# Patient Record
Sex: FEMALE | Race: WHITE | Employment: FULL TIME | URBAN - METROPOLITAN AREA
[De-identification: names, ages, dates, MRNs, and addresses within clinical notes are randomized per-mention and may not be internally consistent; named-entity substitution may affect disease eponyms.]

---

## 2017-02-23 ENCOUNTER — OFFICE VISIT (OUTPATIENT)
Dept: OBGYN | Facility: CLINIC | Age: 52
End: 2017-02-23
Payer: COMMERCIAL

## 2017-02-23 VITALS — BODY MASS INDEX: 23.48 KG/M2 | WEIGHT: 136.8 LBS

## 2017-02-23 DIAGNOSIS — Z15.09 BRCA GENE POSITIVE: Primary | ICD-10-CM

## 2017-02-23 DIAGNOSIS — Z15.01 BRCA GENE POSITIVE: Primary | ICD-10-CM

## 2017-02-23 PROCEDURE — 99213 OFFICE O/P EST LOW 20 MIN: CPT | Performed by: OBSTETRICS & GYNECOLOGY

## 2017-02-23 RX ORDER — ALPRAZOLAM 0.25 MG
.25-.5 TABLET ORAL
COMMUNITY
Start: 2016-07-20 | End: 2017-05-04

## 2017-02-23 NOTE — PROGRESS NOTES
SUBJECTIVE:                                                   Nichole Butler is a 51 year old female who presents to clinic today for the following health issue(s):  Patient presents with:  Breast Problem: hx of breast cancer        HPI:  Did self breast exam and concerned that she's feeling something in lateral left breast area.   S/P mastectomy and saline implants.   No pain, muscle strain or skin changes.    No LMP recorded. Patient is postmenopausal..   Patient is sexually active, .  Using menopause for contraception.    reports that she has quit smoking. She does not have any smokeless tobacco history on file.    STD testing offered?  Declined    Health maintenance updated:  yes    Today's PHQ-2 Score:   PHQ-2 (  [employer]) 3/29/2016   Q1: Little interest or pleasure in doing things 0   Q2: Feeling down, depressed or hopeless 0   PHQ-2 Score 0     Today's PHQ-9 Score:   PHQ-9 SCORE 2016   Total Score 3     Today's DAMION-7 Score:   DAMION-7 SCORE 2016   Total Score 8       Problem list and histories reviewed & adjusted, as indicated.  Additional history: as documented.    Patient Active Problem List   Diagnosis     Allergic rhinitis due to pollen     Atrophic vaginitis     BRCA gene positive     Lichen sclerosus     Family history of ovarian cancer     Worried well     Advanced care planning/counseling discussion     Past Surgical History   Procedure Laterality Date     Laparoscopic hysterectomy total       LSH - Total hys     Gyn surgery       BSO     Knee surgery       Laparoscopy  2011 for pelvic mass. Bx benign     Mastectomy, bilateral       prophylactic with reconstruction with saline implants due to BR Ca +       Social History   Substance Use Topics     Smoking status: Former Smoker     Smokeless tobacco: Not on file     Alcohol use Not on file      Problem (# of Occurrences) Relation (Name,Age of Onset)    Colon Cancer (1) Other: Uncle    Lung Cancer (1) Father (62)     Ovarian Cancer (1) Sister (54): Another sister with tumor of low malignant potential    Uterine Cancer (1) Other (62): aunt            Current Outpatient Prescriptions   Medication Sig     ALPRAZolam (XANAX) 0.25 MG tablet Take 0.25-0.5 mg by mouth     ranitidine (ZANTAC) 150 MG tablet Take by mouth 2 times daily     estradiol (ESTRACE VAGINAL) 0.1 MG/GM vaginal cream Place 1 g vaginally twice a week     EPINEPHrine (EPIPEN) 0.3 MG/0.3ML injection Inject 0.3 mLs (0.3 mg) into the muscle once as needed for anaphylaxis     multivitamin, therapeutic with minerals (MULTI-VITAMIN) TABS Take 1 tablet by mouth daily     glucosamine-chondroitin 500-400 MG CAPS Take 1 capsule by mouth daily     Polyethylene Glycol 3350 (MIRALAX PO)      Magnesium Oxide 250 MG TABS daily with breakfast. Takes 235 mg     No current facility-administered medications for this visit.      Allergies   Allergen Reactions     Diagnostic X-Ray Materials Hives     Hives to CT contrast. Ok with premedication.     Contrast Dye      CT     Penicillins      Sulfa Drugs        ROS:  12 point review of systems negative other than symptoms noted below.  Head: Ringing  Gastrointestinal: Abdominal Pain and Heartburn    OBJECTIVE:     Wt 136 lb 12.8 oz (62.1 kg)  BMI 23.48 kg/m2  Body mass index is 23.48 kg/(m^2).    Exam:  Constitutional:  Appearance: Well nourished, well developed alert, in no acute distress  Breasts:  Inspection of Breasts: S/P bilateral mastectomies and reconstruction. No mass  Axillary Lymph Nodes:  No lymphadenopathy present  Neurologic/Psychiatric:  Mental Status:  Oriented X3      In-Clinic Test Results:  No results found for this or any previous visit (from the past 24 hour(s)).    ASSESSMENT/PLAN:                                                        ICD-10-CM    1. BRCA gene positive Z15.01     Z15.02          Discussed negative exam. Pt has significant anxiety and unsure of normal anatomy. Reassured, will follow.    Osvaldo  MD Annette  [facility] FOR [facility]

## 2017-02-23 NOTE — MR AVS SNAPSHOT
After Visit Summary   2/23/2017    Nichole Butler    MRN: [id number]           Patient Information     Date Of Birth          1965        Visit Information        Provider Department      2/23/2017 10:00 AM Osvaldo Bryant MD [facility]        Today's Diagnoses     BRCA gene positive    -  1       Follow-ups after your visit        Your next 10 appointments already scheduled     May 19, 2017  9:00 AM CDT   PHYSICAL with Osvaldo Bryant MD   [facility] ([facility]    [address] [place] [id number] [phone]              Who to contact     If you have questions or need follow up information about today's clinic visit or your schedule please contact [facility] directly at [phone].  Normal or non-critical lab and imaging results will be communicated to you by [employer], letter or phone within 4 business days after the clinic has received the results. If you do not hear from us within 7 days, please contact the clinic through Innovit[employer] or phone. If you have a critical or abnormal lab result, we will notify you by phone as soon as possible.  Submit refill requests through [employer] or call your pharmacy and they will forward the refill request to us. Please allow 3 business days for your refill to be completed.          Additional Information About Your Visit        MyChart Information     [employer] gives you secure access to your electronic health record. If you see a primary care provider, you can also send messages to your care team and make appointments. If you have questions, please call your primary care clinic.  If you do not have a primary care provider, please call [phone] and they will assist you.        Care EveryWhere ID     This is your Care EveryWhere ID. This could be used by other organizations to access your [facility] medical records  [id number]         Your Vitals Were     BMI (Body Mass Index)                   23.48 kg/m2            Blood Pressure from Last 3 Encounters:   11/17/16 116/76   05/18/16 106/64   03/29/16 98/60    Weight from Last 3 Encounters:   02/23/17 136 lb 12.8 oz (62.1 kg)   11/17/16 136 lb (61.7 kg)   05/18/16 137 lb (62.1 kg)              Today, you had the following     No orders found for display       Primary Care Provider Office Phone # Fax #    Tanmay Flores [phone]       [facility] [address]  [place] [facility] [address]        Thank you!     Thank you for choosing [facility] [place]  for your care. Our goal is always to provide you with excellent care. Hearing back from our patients is one way we can continue to improve our services. Please take a few minutes to complete the written survey that you may receive in the mail after your visit with us. Thank you!             Your Updated Medication List - Protect others around you: Learn how to safely use, store and throw away your medicines at www.disposemymeds.org.          This list is accurate as of: 2/23/17 10:43 AM.  Always use your most recent med list.                   Brand Name Dispense Instructions for use    ALPRAZolam 0.25 MG tablet    XANAX     Take 0.25-0.5 mg by mouth       EPIPEN 0.3 MG/0.3ML injection   Generic drug:  EPINEPHrine      Inject 0.3 mLs (0.3 mg) into the muscle once as needed for anaphylaxis       estradiol 0.1 MG/GM cream    ESTRACE VAGINAL    42.5 g    Place 1 g vaginally twice a week       glucosamine-chondroitin 500-400 MG Caps per capsule      Take 1 capsule by mouth daily       Magnesium Oxide 250 MG Tabs      daily with breakfast. Takes 235 mg       MIRALAX PO          Multi-vitamin Tabs tablet      Take 1 tablet by mouth daily       ranitidine 150 MG tablet    ZANTAC     Take by mouth 2 times daily

## 2017-05-04 ENCOUNTER — OFFICE VISIT (OUTPATIENT)
Dept: OBGYN | Facility: CLINIC | Age: 52
End: 2017-05-04
Payer: COMMERCIAL

## 2017-05-04 VITALS
SYSTOLIC BLOOD PRESSURE: 110 MMHG | WEIGHT: 136 LBS | DIASTOLIC BLOOD PRESSURE: 64 MMHG | HEIGHT: 64 IN | BODY MASS INDEX: 23.22 KG/M2

## 2017-05-04 DIAGNOSIS — N95.2 ATROPHIC VAGINITIS: ICD-10-CM

## 2017-05-04 DIAGNOSIS — Z01.419 ENCOUNTER FOR GYNECOLOGICAL EXAMINATION WITHOUT ABNORMAL FINDING: Primary | ICD-10-CM

## 2017-05-04 DIAGNOSIS — Z80.41 FAMILY HISTORY OF OVARIAN CANCER: ICD-10-CM

## 2017-05-04 PROCEDURE — 99396 PREV VISIT EST AGE 40-64: CPT | Performed by: OBSTETRICS & GYNECOLOGY

## 2017-05-04 RX ORDER — CITALOPRAM HYDROBROMIDE 10 MG/1
TABLET ORAL
Refills: 3 | COMMUNITY
Start: 2016-07-13 | End: 2017-05-04

## 2017-05-04 RX ORDER — CLONAZEPAM 1 MG/1
TABLET ORAL
Refills: 2 | COMMUNITY
Start: 2016-08-28 | End: 2017-05-04

## 2017-05-04 RX ORDER — ESTRADIOL 0.1 MG/G
1 CREAM VAGINAL
Qty: 42.5 G | Refills: 2 | Status: SHIPPED | OUTPATIENT
Start: 2017-05-04 | End: 2018-01-22

## 2017-05-04 ASSESSMENT — ANXIETY QUESTIONNAIRES
IF YOU CHECKED OFF ANY PROBLEMS ON THIS QUESTIONNAIRE, HOW DIFFICULT HAVE THESE PROBLEMS MADE IT FOR YOU TO DO YOUR WORK, TAKE CARE OF THINGS AT HOME, OR GET ALONG WITH OTHER PEOPLE: NOT DIFFICULT AT ALL
3. WORRYING TOO MUCH ABOUT DIFFERENT THINGS: SEVERAL DAYS
GAD7 TOTAL SCORE: 3
7. FEELING AFRAID AS IF SOMETHING AWFUL MIGHT HAPPEN: NOT AT ALL
6. BECOMING EASILY ANNOYED OR IRRITABLE: NOT AT ALL
2. NOT BEING ABLE TO STOP OR CONTROL WORRYING: SEVERAL DAYS
1. FEELING NERVOUS, ANXIOUS, OR ON EDGE: SEVERAL DAYS
5. BEING SO RESTLESS THAT IT IS HARD TO SIT STILL: NOT AT ALL

## 2017-05-04 ASSESSMENT — PATIENT HEALTH QUESTIONNAIRE - PHQ9: 5. POOR APPETITE OR OVEREATING: NOT AT ALL

## 2017-05-04 NOTE — PROGRESS NOTES
Nichole is a 51 year old  female who presents for annual exam.     Besides routine health maintenance, she has no other health concerns today .    HPI:  The patient's PCP is EDVIN DOHERTY.   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 This is great news for her.   Still using vaginal estrogen.   Hasn't had repeat genetics consult yet to discuss the implications      GYNECOLOGIC HISTORY:    No LMP recorded. Patient has had a hysterectomy.  Her current contraception method is: hysterectomy.  She  reports that she has quit smoking. She has never used smokeless tobacco.    Patient is sexually active.  STD testing offered?  Declined  Last PHQ-9 score on record =   PHQ-9 SCORE 2017   Total Score 0     Last GAD7 score on record =   DAMION-7 SCORE 2017   Total Score 3     Alcohol Score = 2    HEALTH MAINTENANCE:  Cholesterol: Followed by PCP  Last Mammo: N/A, Result: not applicable, Next Mammo: N/A - bilateral mastectomy  Pap: N/A - Total hysterectomy  Colonoscopy: 2015, Result: normal, Next Colonoscopy:   Dexa: Never    Health maintenance updated:  yes    HISTORY:  Obstetric History       T3      TAB0   SAB1   E0   M0   L3       # Outcome Date GA Lbr Jose/2nd Weight Sex Delivery Anes PTL Lv   4 SAB            3 Term         Y   2 Term         Y   1 Term         Y          Patient Active Problem List   Diagnosis     Allergic rhinitis due to pollen     Atrophic vaginitis     BRCA gene positive     Lichen sclerosus     Family history of ovarian cancer     Worried well     Advanced care planning/counseling discussion     Past Surgical History:   Procedure Laterality Date     GYN SURGERY      BSO     KNEE SURGERY       LAPAROSCOPIC HYSTERECTOMY TOTAL      LTH - No cervix     LAPAROSCOPY      [facility] for pelvic mass. Bx benign     MASTECTOMY, BILATERAL      prophylactic with reconstruction with saline implants due to BR Ca +       Social  "History   Substance Use Topics     Smoking status: Former Smoker     Smokeless tobacco: Never Used     Alcohol use 0.0 oz/week     0 Standard drinks or equivalent per week      Problem (# of Occurrences) Relation (Name,Age of Onset)    Colon Cancer (1) Other: Uncle    Lung Cancer (1) Father (62)    Ovarian Cancer (1) Sister (54): Another sister with tumor of low malignant potential    Uterine Cancer (1) Other (62): aunt            Current Outpatient Prescriptions   Medication Sig     Magnesium Oxide 250 MG TABS daily with breakfast. Takes 235 mg     ranitidine (ZANTAC) 150 MG tablet Take by mouth 2 times daily     estradiol (ESTRACE VAGINAL) 0.1 MG/GM vaginal cream Place 1 g vaginally twice a week     EPINEPHrine (EPIPEN) 0.3 MG/0.3ML injection Inject 0.3 mLs (0.3 mg) into the muscle once as needed for anaphylaxis     multivitamin, therapeutic with minerals (MULTI-VITAMIN) TABS Take 1 tablet by mouth daily     glucosamine-chondroitin 500-400 MG CAPS Take 1 capsule by mouth daily     Polyethylene Glycol 3350 (MIRALAX PO)      No current facility-administered medications for this visit.      Allergies   Allergen Reactions     Diagnostic X-Ray Materials Hives     Hives to CT contrast. Ok with premedication.     Contrast Dye      CT     Penicillins      Sulfa Drugs        Past medical, surgical, social and family histories were reviewed and updated in EPIC.    ROS:   12 point review of systems negative other than symptoms noted below.  Head: Ringing  Gastrointestinal: Heartburn  Genitourinary: Vaginal Dryness  Neurologic: Headaches  Musculoskeletal: Joint Pain  Psychiatric: Anxiety    EXAM:  /64  Ht 5' 4\" (1.626 m)  Wt 136 lb (61.7 kg)  Breastfeeding? No  BMI 23.34 kg/m2   BMI: Body mass index is 23.34 kg/(m^2).    PHYSICAL EXAM:  Constitutional:  Appearance: Well nourished, well developed, alert, in no acute distress  Neck:  Lymph Nodes:  No lymphadenopathy present    Thyroid:  Gland size normal, nontender, " no nodules or masses present  on palpation  Chest:  Respiratory Effort:  Breathing unlabored  Cardiovascular:    Heart: Auscultation:  Regular rate, normal rhythm, no murmurs present  Breasts: Inspection of Breasts:  No lymphadenopathy present    Palpation of Breasts and Axillae:  Bilateral mastectomy  Axillary Lymph Nodes:  No lymphadenopathy present  Gastrointestinal:   Abdominal Examination:  Abdomen nontender to palpation, tone normal without rigidity or guarding, no masses present, umbilicus without lesions   Liver and Spleen:  No hepatomegaly present, liver nontender to palpation    Hernias:  No hernias present  Lymphatic: Lymph Nodes:  No other lymphadenopathy present  Skin:  General Inspection:  No rashes present, no lesions present, no areas of  discoloration    Genitalia and Groin:  No rashes present, no lesions present, no areas of  discoloration, no masses present  Neurologic/Psychiatric:    Mental Status:  Oriented X3     Pelvic Exam:  External Genitalia:     Normal appearance for age, no discharge present, no tenderness present, no inflammatory lesions present, color normal  Vagina:     Normal vaginal vault without central or paravaginal defects, no discharge present, no inflammatory lesions present, no masses present  Bladder:     Nontender to palpation  Urethra:   Urethral Body:  Urethra palpation normal, urethra structural support normal   Urethral Meatus:  No erythema or lesions present  Cervix:     Surgically absent  Uterus:     Surgically absent  Adnexa:     Surgically absent  Perineum:     Perineum within normal limits, no evidence of trauma, no rashes or skin lesions present  Anus:     Anus within normal limits, no hemorrhoids present  Inguinal Lymph Nodes:     No lymphadenopathy present    COUNSELING:   Reviewed preventive health counseling, as reflected in patient instructions       Regular exercise    BMI: Body mass index is 23.34 kg/(m^2).      ASSESSMENT:  51 year old female with  satisfactory annual exam.    ICD-10-CM    1. Encounter for gynecological examination without abnormal finding Z01.419    2. Atrophic vaginitis N95.2 estradiol (ESTRACE VAGINAL) 0.1 MG/GM cream   3. Family history of ovarian cancer Z80.41        PLAN:  Discussed if not Brca mutation could consider HRT if having issues.     Osvaldo Bryant MD

## 2017-05-04 NOTE — MR AVS SNAPSHOT
After Visit Summary   5/4/2017    Nichole Butler    MRN: [id number]           Patient Information     Date Of Birth          1965        Visit Information        Provider Department      5/4/2017 9:30 AM Osvaldo Bryant MD [facility]        Today's Diagnoses     Encounter for gynecological examination without abnormal finding    -  1    Atrophic vaginitis           Follow-ups after your visit        Your next 10 appointments already scheduled     Nov 07, 2017  9:20 AM CST   SHORT with Osvaldo Bryant MD   [facility] ([facility])    [address] [place] [id number] [phone]              Who to contact     If you have questions or need follow up information about today's clinic visit or your schedule please contact [facility] directly at [phone].  Normal or non-critical lab and imaging results will be communicated to you by MyChart, letter or phone within 4 business days after the clinic has received the results. If you do not hear from us within 7 days, please contact the clinic through [employer]ATM[employer] or phone. If you have a critical or abnormal lab result, we will notify you by phone as soon as possible.  Submit refill requests through [employer] or call your pharmacy and they will forward the refill request to us. Please allow 3 business days for your refill to be completed.          Additional Information About Your Visit        MyChart Information     [employer] gives you secure access to your electronic health record. If you see a primary care provider, you can also send messages to your care team and make appointments. If you have questions, please call your primary care clinic.  If you do not have a primary care provider, please call [phone] and they will assist you.        Care EveryWhere ID     This is your Care EveryWhere ID. This could be used by other  "organizations to access your [facility] medical records  [id number]        Your Vitals Were     Height Breastfeeding? BMI (Body Mass Index)             5' 4\" (1.626 m) No 23.34 kg/m2          Blood Pressure from Last 3 Encounters:   05/04/17 110/64   11/17/16 116/76   05/18/16 106/64    Weight from Last 3 Encounters:   05/04/17 136 lb (61.7 kg)   02/23/17 136 lb 12.8 oz (62.1 kg)   11/17/16 136 lb (61.7 kg)              Today, you had the following     No orders found for display       Primary Care Provider Office Phone # Fax #    Tanmay Betancourtman [phone]       [facility] [address]  [place] [address]        Thank you!     Thank you for choosing [facility] [place]  for your care. Our goal is always to provide you with excellent care. Hearing back from our patients is one way we can continue to improve our services. Please take a few minutes to complete the written survey that you may receive in the mail after your visit with us. Thank you!             Your Updated Medication List - Protect others around you: Learn how to safely use, store and throw away your medicines at www.disposemymeds.org.          This list is accurate as of: 5/4/17 10:07 AM.  Always use your most recent med list.                   Brand Name Dispense Instructions for use    EPIPEN 0.3 MG/0.3ML injection   Generic drug:  EPINEPHrine      Inject 0.3 mLs (0.3 mg) into the muscle once as needed for anaphylaxis       estradiol 0.1 MG/GM cream    ESTRACE VAGINAL    42.5 g    Place 1 g vaginally twice a week       glucosamine-chondroitin 500-400 MG Caps per capsule      Take 1 capsule by mouth daily       Magnesium Oxide 250 MG Tabs      daily with breakfast. Takes 235 mg       MIRALAX PO          Multi-vitamin Tabs tablet      Take 1 tablet by mouth daily       ranitidine 150 MG tablet    ZANTAC     Take by mouth 2 times daily         "

## 2017-05-05 ASSESSMENT — ANXIETY QUESTIONNAIRES: GAD7 TOTAL SCORE: 3

## 2017-05-05 ASSESSMENT — PATIENT HEALTH QUESTIONNAIRE - PHQ9: SUM OF ALL RESPONSES TO PHQ QUESTIONS 1-9: 0

## 2017-11-07 ENCOUNTER — OFFICE VISIT (OUTPATIENT)
Dept: OBGYN | Facility: CLINIC | Age: 52
End: 2017-11-07
Payer: COMMERCIAL

## 2017-11-07 VITALS
HEIGHT: 64 IN | BODY MASS INDEX: 23.73 KG/M2 | WEIGHT: 139 LBS | SYSTOLIC BLOOD PRESSURE: 106 MMHG | DIASTOLIC BLOOD PRESSURE: 64 MMHG

## 2017-11-07 DIAGNOSIS — Z15.09 BRCA GENE POSITIVE: ICD-10-CM

## 2017-11-07 DIAGNOSIS — N95.2 ATROPHIC VAGINITIS: Primary | ICD-10-CM

## 2017-11-07 DIAGNOSIS — Z15.01 BRCA GENE POSITIVE: ICD-10-CM

## 2017-11-07 PROCEDURE — 99213 OFFICE O/P EST LOW 20 MIN: CPT | Performed by: OBSTETRICS & GYNECOLOGY

## 2017-11-07 NOTE — MR AVS SNAPSHOT
After Visit Summary   11/7/2017    Nichole Butler    MRN: [id number]           Patient Information     Date Of Birth          1965        Visit Information        Provider Department      11/7/2017 9:20 AM Osvaldo Bryant MD [facility]        Today's Diagnoses     Atrophic vaginitis    -  1    BRCA gene positive           Follow-ups after your visit        Your next 10 appointments already scheduled     May 08, 2018  9:20 AM CDT   [facility]    [address] [place] [id number] [phone]              Who to contact     If you have questions or need follow up information about today's clinic visit or your schedule please contact [facility] directly at [phone].  Normal or non-critical lab and imaging results will be communicated to you by MyChart, letter or phone within 4 business days after the clinic has received the results. If you do not hear from us within 7 days, please contact the clinic through MyChart or phone. If you have a critical or abnormal lab result, we will notify you by phone as soon as possible.  Submit refill requests through [employer] or call your pharmacy and they will forward the refill request to us. Please allow 3 business days for your refill to be completed.          Additional Information About Your Visit        MyChart Information     [employer] gives you secure access to your electronic health record. If you see a primary care provider, you can also send messages to your care team and make appointments. If you have questions, please call your primary care clinic.  If you do not have a primary care provider, please call [phone] and they will assist you.        Care EveryWhere ID     This is your Care EveryWhere ID. This could be used by other organizations to access your [facility]  "records  [id number]        Your Vitals Were     Height Breastfeeding? BMI (Body Mass Index)             5' 4\" (1.626 m) No 23.86 kg/m2          Blood Pressure from Last 3 Encounters:   11/07/17 106/64   05/04/17 110/64   11/17/16 116/76    Weight from Last 3 Encounters:   11/07/17 139 lb (63 kg)   05/04/17 136 lb (61.7 kg)   02/23/17 136 lb 12.8 oz (62.1 kg)              Today, you had the following     No orders found for display         Today's Medication Changes          These changes are accurate as of: 11/7/17 10:48 AM.  If you have any questions, ask your nurse or doctor.               These medicines have changed or have updated prescriptions.        Dose/Directions    estradiol 0.1 MG/GM cream   Commonly known as:  ESTRACE VAGINAL   This may have changed:  Another medication with the same name was removed. Continue taking this medication, and follow the directions you see here.   Used for:  Atrophic vaginitis   Changed by:  Osvaldo Bryant MD        Dose:  1 g   Place 1 g vaginally twice a week   Quantity:  42.5 g   Refills:  2                Primary Care Provider Office Phone # Fax #    Tanmay COOK [facility] [phone]       [facility] [address] [address]        Equal Access to Services     SAUL LEWIS AH: Hadii aad ku hadasho Soomaali, waaxda luqadaha, qaybta kaalmada adeegyada, kalee sherman. So [facility] [phone].    ATENCIÓN: Si habla español, tiene a paul disposición servicios gratuitos de asistencia lingüística. Ramon al [phone].    We comply with applicable federal civil rights laws and [place] laws. We do not discriminate on the basis of race, color, national origin, age, disability, sex, sexual orientation, or gender identity.            Thank you!     Thank you for choosing [facility] [place]  for your care. Our goal is always to provide you with excellent care. Hearing back from our patients " is one way we can continue to improve our services. Please take a few minutes to complete the written survey that you may receive in the mail after your visit with us. Thank you!             Your Updated Medication List - Protect others around you: Learn how to safely use, store and throw away your medicines at www.disposemymeds.org.          This list is accurate as of: 11/7/17 10:48 AM.  Always use your most recent med list.                   Brand Name Dispense Instructions for use Diagnosis    EPIPEN 0.3 MG/0.3ML injection 2-pack   Generic drug:  EPINEPHrine      Inject 0.3 mLs (0.3 mg) into the muscle once as needed for anaphylaxis        estradiol 0.1 MG/GM cream    ESTRACE VAGINAL    42.5 g    Place 1 g vaginally twice a week    Atrophic vaginitis       glucosamine-chondroitin 500-400 MG Caps per capsule      Take 1 capsule by mouth daily        Magnesium Oxide 250 MG Tabs      daily with breakfast. Takes 235 mg        MIRALAX PO           Multi-vitamin Tabs tablet      Take 1 tablet by mouth daily        ranitidine 150 MG tablet    ZANTAC     Take by mouth 2 times daily        sertraline 50 MG tablet    ZOLOFT     Take 25 mg by mouth

## 2017-11-07 NOTE — PROGRESS NOTES
SUBJECTIVE:                                                   Nichole Butler is a 52 year old female who presents to clinic today for the following health issue(s):  Patient presents with:  Breast Exam: 6 month breast exam, elective double mastectomy      Additional information: c/o vaginal discharge, denies itching or odor    HPI:  Doing well. Hasn't make contact with genetic counselor about change in gene mutation status.  Notes some vaginal or bladder wetness. Not everyday. No irritation. No burning or odor. Using Estrace cream once a week.      No LMP recorded. Patient has had a hysterectomy..   Patient is sexually active, .  Using hysterectomy for contraception.    reports that she has quit smoking. She has never used smokeless tobacco.      STD testing offered?  Declined    Health maintenance updated:  yes    Today's PHQ-2 Score:   PHQ-2 (  [employer]) 3/29/2016   Q1: Little interest or pleasure in doing things 0   Q2: Feeling down, depressed or hopeless 0   PHQ-2 Score 0     Today's PHQ-9 Score:   PHQ-9 SCORE 2017   Total Score 0     Today's DAMION-7 Score:   DAMION-7 SCORE 2017   Total Score 3       Problem list and histories reviewed & adjusted, as indicated.  Additional history: as documented.    Patient Active Problem List   Diagnosis     Allergic rhinitis due to pollen     Atrophic vaginitis     BRCA gene positive     Lichen sclerosus     Family history of ovarian cancer     Worried well     Advanced care planning/counseling discussion     Past Surgical History:   Procedure Laterality Date     GYN SURGERY      BSO     KNEE SURGERY       LAPAROSCOPIC HYSTERECTOMY TOTAL      LTH - No cervix     LAPAROSCOPY      [facility] for pelvic mass. Bx benign     MASTECTOMY, BILATERAL      prophylactic with reconstruction with saline implants due to BR Ca +       Social History   Substance Use Topics     Smoking status: Former Smoker     Smokeless tobacco: Never Used     Alcohol use 0.0 oz/week     0  "Standard drinks or equivalent per week      Problem (# of Occurrences) Relation (Name,Age of Onset)    Colon Cancer (1) Other: Uncle    Lung Cancer (1) Father (62)    Ovarian Cancer (1) Sister (54): Another sister with tumor of low malignant potential    Uterine Cancer (1) Other (62): aunt            Current Outpatient Prescriptions   Medication Sig     sertraline (ZOLOFT) 50 MG tablet Take 25 mg by mouth     estradiol (ESTRACE VAGINAL) 0.1 MG/GM cream Place 1 g vaginally twice a week     Magnesium Oxide 250 MG TABS daily with breakfast. Takes 235 mg     ranitidine (ZANTAC) 150 MG tablet Take by mouth 2 times daily     EPINEPHrine (EPIPEN) 0.3 MG/0.3ML injection Inject 0.3 mLs (0.3 mg) into the muscle once as needed for anaphylaxis     multivitamin, therapeutic with minerals (MULTI-VITAMIN) TABS Take 1 tablet by mouth daily     glucosamine-chondroitin 500-400 MG CAPS Take 1 capsule by mouth daily     Polyethylene Glycol 3350 (MIRALAX PO)      No current facility-administered medications for this visit.      Allergies   Allergen Reactions     Diagnostic X-Ray Materials Hives     Hives to CT contrast. Ok with premedication.     Contrast Dye      CT     Penicillins      Sulfa Drugs        ROS:  12 point review of systems negative other than symptoms noted below.  Gastrointestinal: Heartburn  Genitourinary: Frequency  Psychiatric: Anxiety    OBJECTIVE:     /64  Ht 5' 4\" (1.626 m)  Wt 139 lb (63 kg)  Breastfeeding? No  BMI 23.86 kg/m2  Body mass index is 23.86 kg/(m^2).    Exam:  Constitutional:  Appearance: Well nourished, well developed alert, in no acute distress  Breasts:  Inspection of Breasts:  S/P bilateral mastectomy and reconstruction.  Breasts and Axillae:  No masses present on palpation, no breast tenderness Axillary Lymph Nodes:  No lymphadenopathy present  Neurologic/Psychiatric:  Mental Status:  Oriented X3   Pelvic Exam:  External Genitalia:     Normal appearance for age, no discharge present, no " tenderness present, no inflammatory lesions present, color normal  Vagina:     ATROPHIC. NO LESIONS OR D/C  Bladder:     Nontender to palpation  Urethra:   Urethral Body:  Urethra palpation normal, urethra structural support normal   Urethral Meatus:  No erythema or lesions present  Cervix:     Surgically absent  Perineum:     Perineum within normal limits, no evidence of trauma, no rashes or skin lesions present  Anus:     Anus within normal limits, no hemorrhoids present  Inguinal Lymph Nodes:     No lymphadenopathy present  Pubic Hair:     Normal pubic hair distribution for age  Genitalia and Groin:     No rashes present, no lesions present, no areas of discoloration, no masses present       In-Clinic Test Results:  No results found for this or any previous visit (from the past 24 hour(s)).    ASSESSMENT/PLAN:                                                        ICD-10-CM    1. Atrophic vaginitis N95.2    2. BRCA gene positive Z15.01     Z15.02        RTC 6 months. No vaginal findings. Observe for now.  Call Genetic counselor.    Osvaldo Bryant MD  [facility]

## 2018-01-22 DIAGNOSIS — N95.2 ATROPHIC VAGINITIS: ICD-10-CM

## 2018-01-22 RX ORDER — ESTRADIOL 0.1 MG/G
1 CREAM VAGINAL
Qty: 42.5 G | Refills: 0 | Status: SHIPPED | OUTPATIENT
Start: 2018-01-22 | End: 2018-05-08

## 2018-01-22 NOTE — TELEPHONE ENCOUNTER
Refill Error received from [facility] [place], stating rx not found.    Estradiol 0.01% vaginal cream       Last Written Prescription Date:  5/4/17  Last Fill Quantity: 42.5g,   # refills: 2  Last Office Visit: 5/4/17  Future Office visit:   none    Rx was not sent for year supply at last annual, routing to Dr. Jennifer rebollar for refill?

## 2018-05-08 ENCOUNTER — OFFICE VISIT (OUTPATIENT)
Dept: OBGYN | Facility: CLINIC | Age: 53
End: 2018-05-08
Payer: COMMERCIAL

## 2018-05-08 VITALS
DIASTOLIC BLOOD PRESSURE: 68 MMHG | HEART RATE: 68 BPM | WEIGHT: 140 LBS | HEIGHT: 64 IN | BODY MASS INDEX: 23.9 KG/M2 | SYSTOLIC BLOOD PRESSURE: 110 MMHG

## 2018-05-08 DIAGNOSIS — Z01.419 ENCOUNTER FOR GYNECOLOGICAL EXAMINATION WITHOUT ABNORMAL FINDING: Primary | ICD-10-CM

## 2018-05-08 DIAGNOSIS — Z15.09 BRCA GENE POSITIVE: ICD-10-CM

## 2018-05-08 DIAGNOSIS — Z80.41 FAMILY HISTORY OF OVARIAN CANCER: ICD-10-CM

## 2018-05-08 DIAGNOSIS — N95.2 ATROPHIC VAGINITIS: ICD-10-CM

## 2018-05-08 DIAGNOSIS — Z15.01 BRCA GENE POSITIVE: ICD-10-CM

## 2018-05-08 PROCEDURE — 99396 PREV VISIT EST AGE 40-64: CPT | Performed by: OBSTETRICS & GYNECOLOGY

## 2018-05-08 RX ORDER — ESTRADIOL 0.1 MG/G
1 CREAM VAGINAL
Qty: 42.5 G | Refills: 1 | Status: SHIPPED | OUTPATIENT
Start: 2018-05-10 | End: 2019-09-23

## 2018-05-08 ASSESSMENT — ANXIETY QUESTIONNAIRES
1. FEELING NERVOUS, ANXIOUS, OR ON EDGE: SEVERAL DAYS
IF YOU CHECKED OFF ANY PROBLEMS ON THIS QUESTIONNAIRE, HOW DIFFICULT HAVE THESE PROBLEMS MADE IT FOR YOU TO DO YOUR WORK, TAKE CARE OF THINGS AT HOME, OR GET ALONG WITH OTHER PEOPLE: NOT DIFFICULT AT ALL
6. BECOMING EASILY ANNOYED OR IRRITABLE: NOT AT ALL
GAD7 TOTAL SCORE: 2
5. BEING SO RESTLESS THAT IT IS HARD TO SIT STILL: NOT AT ALL
2. NOT BEING ABLE TO STOP OR CONTROL WORRYING: NOT AT ALL
7. FEELING AFRAID AS IF SOMETHING AWFUL MIGHT HAPPEN: NOT AT ALL
3. WORRYING TOO MUCH ABOUT DIFFERENT THINGS: SEVERAL DAYS

## 2018-05-08 ASSESSMENT — PATIENT HEALTH QUESTIONNAIRE - PHQ9: 5. POOR APPETITE OR OVEREATING: NOT AT ALL

## 2018-05-08 NOTE — PROGRESS NOTES
Nichole is a 52 year old  female who presents for annual exam.     Besides routine health maintenance, she has no other health concerns today .    HPI:  The patient's PCP is EDVIN DOHERTY.    No changes. Comes twice a year for exam.  Some vaginal or urine leakage but intermittent.  Having discomfort from anal fissure.      GYNECOLOGIC HISTORY:    No LMP recorded. Patient has had a hysterectomy.  Her current contraception method is: hysterectomy.  She  reports that she has quit smoking. She has never used smokeless tobacco.    Patient is sexually active.  STD testing offered?  Declined  Last PHQ-9 score on record =   PHQ-9 SCORE 2018   Total Score 0     Last GAD7 score on record =   DAMION-7 SCORE 2018   Total Score 2     Alcohol Score = 1 or 2    HEALTH MAINTENANCE:  Cholesterol: 17  Total= 260, Triglycerides=130, HDL=66, ARF=341,      Last Mammo: NA, Result: na, Next Mammo:   Pap: (No results found for: PAP )   Colonoscopy:  12/1/15, Result: normal, Next Colonoscopy: 10 years.  Dexa:  Na     Health maintenance updated:  yes    HISTORY:  Obstetric History       T3      L3     SAB1   TAB0   Ectopic0   Multiple0   Live Births3       # Outcome Date GA Lbr Jose/2nd Weight Sex Delivery Anes PTL Lv   4 SAB            3 Term         JENA   2 Term         JENA   1 Term         JENA          Patient Active Problem List   Diagnosis     Allergic rhinitis due to pollen     Atrophic vaginitis     BRCA gene positive     Lichen sclerosus     Family history of ovarian cancer     Worried well     Advanced care planning/counseling discussion     Past Surgical History:   Procedure Laterality Date     GYN SURGERY      BSO     KNEE SURGERY       LAPAROSCOPIC HYSTERECTOMY TOTAL      LTH - No cervix     LAPAROSCOPY      [facility] for pelvic mass. Bx benign     MASTECTOMY, BILATERAL      prophylactic with reconstruction with saline implants due to BR Ca +       Social History   Substance Use Topics      "Smoking status: Former Smoker     Smokeless tobacco: Never Used     Alcohol use 0.0 oz/week     0 Standard drinks or equivalent per week      Problem (# of Occurrences) Relation (Name,Age of Onset)    Colon Cancer (1) Other: Uncle    Lung Cancer (1) Father (62)    Ovarian Cancer (1) Sister (54): Another sister with tumor of low malignant potential    Uterine Cancer (1) Other (62): aunt            Current Outpatient Prescriptions   Medication Sig     EPINEPHrine (EPIPEN) 0.3 MG/0.3ML injection Inject 0.3 mLs (0.3 mg) into the muscle once as needed for anaphylaxis     [START ON 5/10/2018] estradiol (ESTRACE VAGINAL) 0.1 MG/GM cream Place 1 g vaginally twice a week     multivitamin, therapeutic with minerals (MULTI-VITAMIN) TABS Take 1 tablet by mouth daily     Polyethylene Glycol 3350 (MIRALAX PO)      ranitidine (ZANTAC) 150 MG tablet Take by mouth 2 times daily     sertraline (ZOLOFT) 50 MG tablet Take 25 mg by mouth     No current facility-administered medications for this visit.      Allergies   Allergen Reactions     Diagnostic X-Ray Materials Hives     Hives to CT contrast. Ok with premedication.     Contrast Dye      CT     Penicillins      Sulfa Drugs        Past medical, surgical, social and family histories were reviewed and updated in EPIC.    ROS:   12 point review of systems negative other than symptoms noted below.    EXAM:  /68  Pulse 68  Ht 5' 4\" (1.626 m)  Wt 140 lb (63.5 kg)  BMI 24.03 kg/m2   BMI: Body mass index is 24.03 kg/(m^2).    PHYSICAL EXAM:  Constitutional:  Appearance: Well nourished, well developed, alert, in no acute distress  Neck:  Lymph Nodes:  No lymphadenopathy present    Thyroid:  Gland size normal, nontender, no nodules or masses present  on palpation  Chest:  Respiratory Effort:  Breathing unlabored  Cardiovascular:    Heart: Auscultation:  Regular rate, normal rhythm, no murmurs present  Breasts: Bilateral mastectomy and reconstruction  Gastrointestinal:   Abdominal " Examination:  Abdomen nontender to palpation, tone normal without rigidity or guarding, no masses present, umbilicus without lesions   Liver and Spleen:  No hepatomegaly present, liver nontender to palpation    Hernias:  No hernias present  Lymphatic: Lymph Nodes:  No other lymphadenopathy present  Skin:  General Inspection:  No rashes present, no lesions present, no areas of  discoloration    Genitalia and Groin:  No rashes present, no lesions present, no areas of  discoloration, no masses present  Neurologic/Psychiatric:    Mental Status:  Oriented X3     Pelvic Exam:  External Genitalia:     Normal appearance for age, no discharge present, no tenderness present, no inflammatory lesions present, color normal  Vagina:     Normal vaginal vault without central or paravaginal defects, no discharge present, no inflammatory lesions present, no masses present  Bladder:     Nontender to palpation  Urethra:   Urethral Body:  Urethra palpation normal, urethra structural support normal   Urethral Meatus:  No erythema or lesions present  Cervix:     Surgically absent  Uterus:     Surgically absent  Adnexa:     Surgically absent  Perineum:     Perineum within normal limits, no evidence of trauma, no rashes or skin lesions present  Anus:     Anus within normal limits, no hemorrhoids present  Inguinal Lymph Nodes:     No lymphadenopathy present    COUNSELING:   Reviewed preventive health counseling, as reflected in patient instructions       Regular exercise    BMI: Body mass index is 24.03 kg/(m^2).      ASSESSMENT:  52 year old female with satisfactory annual exam.    ICD-10-CM    1. Encounter for gynecological examination without abnormal finding Z01.419    2. Atrophic vaginitis N95.2 estradiol (ESTRACE VAGINAL) 0.1 MG/GM cream   3. BRCA gene positive Z15.01     Z15.02    4. Family history of ovarian cancer Z80.41        PLAN:  Refill vaginal estrogen cream.   Try OTC steroid to fissure.      Osvaldo Bryant MD

## 2018-05-08 NOTE — MR AVS SNAPSHOT
After Visit Summary   5/8/2018    Nichole Butler    MRN: [id number]           Patient Information     Date Of Birth          1965        Visit Information        Provider Department      5/8/2018 9:20 AM Osvaldo Bryant MD [facility]        Today's Diagnoses     Encounter for gynecological examination without abnormal finding    -  1    Atrophic vaginitis        BRCA gene positive        Family history of ovarian cancer           Follow-ups after your visit        Your next 10 appointments already scheduled     Nov 08, 2018  9:00 AM CST   Office Visit with Osvaldo Bryant MD   [facility] ([facility])    [address] [place] [id number]   [phone]           Bring a current list of meds and any records pertaining to this visit. For Physicals, please bring immunization records and any forms needing to be filled out. Please arrive 10 minutes early to complete paperwork.              Who to contact     If you have questions or need follow up information about today's clinic visit or your schedule please contact [facility] directly at [phone].  Normal or non-critical lab and imaging results will be communicated to you by Companyhart, letter or phone within 4 business days after the clinic has received the results. If you do not hear from us within 7 days, please contact the clinic through [employer] or phone. If you have a critical or abnormal lab result, we will notify you by phone as soon as possible.  Submit refill requests through [employer] or call your pharmacy and they will forward the refill request to us. Please allow 3 business days for your refill to be completed.          Additional Information About Your Visit        MyChart Information     [employer] gives you secure access to your electronic health record. If you see a primary care provider, you can also send messages to  "your care team and make appointments. If you have questions, please call your primary care clinic.  If you do not have a primary care provider, please call [phone] and they will assist you.        Care EveryWhere ID     This is your Care EveryWhere ID. This could be used by other organizations to access your [facility] medical records  [id number]        Your Vitals Were     Pulse Height BMI (Body Mass Index)             68 5' 4\" (1.626 m) 24.03 kg/m2          Blood Pressure from Last 3 Encounters:   05/08/18 110/68   11/07/17 106/64   05/04/17 110/64    Weight from Last 3 Encounters:   05/08/18 140 lb (63.5 kg)   11/07/17 139 lb (63 kg)   05/04/17 136 lb (61.7 kg)              Today, you had the following     No orders found for display         Where to get your medicines      These medications were sent to [id number] - [place], [place] - [address] AT [facility] [place] [address]  [address], [place] [address]     Phone:  [phone]     estradiol 0.1 MG/GM cream          Primary Care Provider Office Phone # Fax #    Tanmay COOK Mark [phone]       [facility] [address]  [place] [address]        Equal Access to Services     [facility]: Hadii aad ku hadasho Soomaali, waaxda luqadaha, qaybta kaalmada adeegyada, kalee arora . So [facility] [phone].    ATENCIÓN: Si habla español, tiene a paul disposición servicios gratuitos de asistencia lingüística. Llame al [phone].    We comply with applicable federal civil rights laws and [place] laws. We do not discriminate on the basis of race, color, national origin, age, disability, sex, sexual orientation, or gender identity.            Thank you!     Thank you for choosing [facility]  for your care. Our goal is always to provide you with excellent care. Hearing back from our patients is one way we can continue to improve our " services. Please take a few minutes to complete the written survey that you may receive in the mail after your visit with us. Thank you!             Your Updated Medication List - Protect others around you: Learn how to safely use, store and throw away your medicines at www.disposemymeds.org.          This list is accurate as of 5/8/18  9:57 AM.  Always use your most recent med list.                   Brand Name Dispense Instructions for use Diagnosis    EPIPEN 0.3 MG/0.3ML injection 2-pack   Generic drug:  EPINEPHrine      Inject 0.3 mLs (0.3 mg) into the muscle once as needed for anaphylaxis        estradiol 0.1 MG/GM cream   Start taking on:  5/10/2018    ESTRACE VAGINAL    42.5 g    Place 1 g vaginally twice a week    Atrophic vaginitis       MIRALAX PO           Multi-vitamin Tabs tablet      Take 1 tablet by mouth daily        ranitidine 150 MG tablet    ZANTAC     Take by mouth 2 times daily        sertraline 50 MG tablet    ZOLOFT     Take 25 mg by mouth

## 2018-05-09 ASSESSMENT — PATIENT HEALTH QUESTIONNAIRE - PHQ9: SUM OF ALL RESPONSES TO PHQ QUESTIONS 1-9: 0

## 2018-05-09 ASSESSMENT — ANXIETY QUESTIONNAIRES: GAD7 TOTAL SCORE: 2

## 2018-11-02 ENCOUNTER — OFFICE VISIT (OUTPATIENT)
Dept: OBGYN | Facility: CLINIC | Age: 53
End: 2018-11-02
Payer: COMMERCIAL

## 2018-11-02 VITALS
HEART RATE: 72 BPM | HEIGHT: 64 IN | BODY MASS INDEX: 24.59 KG/M2 | WEIGHT: 144 LBS | SYSTOLIC BLOOD PRESSURE: 110 MMHG | DIASTOLIC BLOOD PRESSURE: 60 MMHG

## 2018-11-02 DIAGNOSIS — Z15.01 BRCA GENE POSITIVE: Primary | ICD-10-CM

## 2018-11-02 DIAGNOSIS — Z15.09 BRCA GENE POSITIVE: Primary | ICD-10-CM

## 2018-11-02 PROCEDURE — 99213 OFFICE O/P EST LOW 20 MIN: CPT | Performed by: OBSTETRICS & GYNECOLOGY

## 2018-11-02 NOTE — PROGRESS NOTES
SUBJECTIVE:                                                   Nichole Butler is a 53 year old female who presents to clinic today for the following health issue(s):  Patient presents with:  Breast Exam      HPI:  Here for 6 month breast exam.   S/P bilateral mastectomy. BRCA2 mutation.  Also having some gas trapping in vagina.    No LMP recorded. Patient has had a hysterectomy..   Patient is sexually active, .  Using hysterectomy for contraception.    reports that she has quit smoking. She has never used smokeless tobacco.    STD testing offered?  Declined    Health maintenance updated:  yes    Problem list and histories reviewed & adjusted, as indicated.  Additional history: as documented.    Patient Active Problem List   Diagnosis     Allergic rhinitis due to pollen     Atrophic vaginitis     BRCA gene positive     Lichen sclerosus     Family history of ovarian cancer     Worried well     Advanced care planning/counseling discussion     Past Surgical History:   Procedure Laterality Date     GYN SURGERY      BSO     KNEE SURGERY       LAPAROSCOPIC HYSTERECTOMY TOTAL      LTH - No cervix     LAPAROSCOPY      [facility] for pelvic mass. Bx benign     MASTECTOMY, BILATERAL      prophylactic with reconstruction with saline implants due to BR Ca +       Social History   Substance Use Topics     Smoking status: Former Smoker     Smokeless tobacco: Never Used     Alcohol use 0.0 oz/week     0 Standard drinks or equivalent per week      Problem (# of Occurrences) Relation (Name,Age of Onset)    Colon Cancer (1) Other: Uncle    Lung Cancer (1) Father (62)    Ovarian Cancer (1) Sister (54): Another sister with tumor of low malignant potential    Uterine Cancer (1) Other (62): aunt            Current Outpatient Prescriptions   Medication Sig     estradiol (ESTRACE VAGINAL) 0.1 MG/GM cream Place 1 g vaginally twice a week     multivitamin, therapeutic with minerals (MULTI-VITAMIN) TABS Take 1 tablet by mouth daily  "    Polyethylene Glycol 3350 (MIRALAX PO)      ranitidine (ZANTAC) 150 MG tablet Take by mouth 2 times daily     EPINEPHrine (EPIPEN) 0.3 MG/0.3ML injection Inject 0.3 mLs (0.3 mg) into the muscle once as needed for anaphylaxis     sertraline (ZOLOFT) 50 MG tablet Take 25 mg by mouth     No current facility-administered medications for this visit.      Allergies   Allergen Reactions     Diagnostic X-Ray Materials Hives     Hives to CT contrast. Ok with premedication.     Contrast Dye      CT     Penicillins      Sulfa Drugs        ROS:  12 point review of systems negative other than symptoms noted below.    OBJECTIVE:     /60  Pulse 72  Ht 5' 4\" (1.626 m)  Wt 144 lb (65.3 kg)  BMI 24.72 kg/m2  Body mass index is 24.72 kg/(m^2).    Exam:  Constitutional:  Appearance: Well nourished, well developed alert, in no acute distress  Breasts:  Inspection of Breasts:Bilateral mastectomies with reconstruction.No masses  Axillary Lymph Nodes:  No lymphadenopathy present  Neurologic/Psychiatric:  Mental Status:  Oriented X3      In-Clinic Test Results:  No results found for this or any previous visit (from the past 24 hour(s)).    ASSESSMENT/PLAN:                                                        ICD-10-CM    1. BRCA gene positive Z15.01     Z15.09        RTC in 6 months for annual exam.  Discussed air trapping and reasons why. Will observe for now.      Osvaldo Bryant MD  [facility] [place]  "

## 2018-11-02 NOTE — MR AVS SNAPSHOT
After Visit Summary   11/2/2018    Nichole Butler    MRN: [id number]           Patient Information     Date Of Birth          1965        Visit Information        Provider Department      11/2/2018 2:30 PM Osvaldo Bryant MD [facility]        Today's Diagnoses     BRCA gene positive    -  1       Follow-ups after your visit        Your next 10 appointments already scheduled     May 07, 2019  9:00 AM CDT   PHYSICAL with Osvaldo Bryant MD   [facility] ([facility])    [address] [place] [id number] [phone]              Who to contact     If you have questions or need follow up information about today's clinic visit or your schedule please contact [facility] directly at [phone].  Normal or non-critical lab and imaging results will be communicated to you by [email or web address], letter or phone within 4 business days after the clinic has received the results. If you do not hear from us within 7 days, please contact the clinic through [email or web address] or phone. If you have a critical or abnormal lab result, we will notify you by phone as soon as possible.  Submit refill requests through [employer] or call your pharmacy and they will forward the refill request to us. Please allow 3 business days for your refill to be completed.          Additional Information About Your Visit        MyChart Information     [employer] gives you secure access to your electronic health record. If you see a primary care provider, you can also send messages to your care team and make appointments. If you have questions, please call your primary care clinic.  If you do not have a primary care provider, please call [phone] and they will assist you.        Care EveryWhere ID     This is your Care EveryWhere ID. This could be used by other organizations to access your [place] medical records  [id number]        Your  "Vitals Were     Pulse Height BMI (Body Mass Index)             72 5' 4\" (1.626 m) 24.72 kg/m2          Blood Pressure from Last 3 Encounters:   11/02/18 110/60   05/08/18 110/68   11/07/17 106/64    Weight from Last 3 Encounters:   11/02/18 144 lb (65.3 kg)   05/08/18 140 lb (63.5 kg)   11/07/17 139 lb (63 kg)              Today, you had the following     No orders found for display       Primary Care Provider Office Phone # Fax #    Tanmay Flores [phone]       [facility] [address]  [place] [address]        Equal Access to Services     DIONISIO LEWIS : Hadii aad ku hadasho Soomaali, waaxda luqadaha, qaybta kaalmada adeegyada, waxay kevinin shreyas arora . So [facility] [phone].    ATENCIÓN: Si habla español, tiene a paul disposición servicios gratuitos de asistencia lingüística. [facility] [phone].    We comply with applicable federal civil rights laws and [place] laws. We do not discriminate on the basis of race, color, national origin, age, disability, sex, sexual orientation, or gender identity.            Thank you!     Thank you for choosing [facility]  for your care. Our goal is always to provide you with excellent care. Hearing back from our patients is one way we can continue to improve our services. Please take a few minutes to complete the written survey that you may receive in the mail after your visit with us. Thank you!             Your Updated Medication List - Protect others around you: Learn how to safely use, store and throw away your medicines at www.disposemymeds.org.          This list is accurate as of 11/2/18  3:35 PM.  Always use your most recent med list.                   Brand Name Dispense Instructions for use Diagnosis    EPIPEN 0.3 MG/0.3ML injection 2-pack   Generic drug:  EPINEPHrine      Inject 0.3 mLs (0.3 mg) into the muscle once as needed for anaphylaxis        estradiol 0.1 MG/GM cream    " ESTRACE VAGINAL    42.5 g    Place 1 g vaginally twice a week    Atrophic vaginitis       MIRALAX PO           Multi-vitamin Tabs tablet      Take 1 tablet by mouth daily        ranitidine 150 MG tablet    ZANTAC     Take by mouth 2 times daily        sertraline 50 MG tablet    ZOLOFT     Take 25 mg by mouth

## 2019-05-06 NOTE — PROGRESS NOTES
Nichole is a 53 year old  female who presents for annual exam.     Besides routine health maintenance, she has no other health concerns today .    HPI:  The patient's PCP is  EDVIN DOHERYT.   No issues. Vaginal estrogen cream is messy. Wonders if vagifem covered now.  Oldest Graduating with masters in Accounting at [employer]. Getting .    GYNECOLOGIC HISTORY:    No LMP recorded. Patient has had a hysterectomy.  Her current contraception method is: hysterectomy.  She  reports that she has quit smoking. She has never used smokeless tobacco.    Patient is sexually active.  STD testing offered?  Declined  Last PHQ-9 score on record =   PHQ-9 SCORE 2019   PHQ-9 Total Score 1     Last GAD7 score on record =   DAMION-7 SCORE 2019   Total Score 1     Alcohol Score = 1    HEALTH MAINTENANCE:  Cholesterol: 2018   Total= 233, Triglycerides=84, HDL=69, PKI=356  Last Mammo: , Result: not applicable, Next Mammo: Bilateral Mastectomy.   Pap: (No results found for: PAP ) [facility]  Colonoscopy:  2015, Result: normal, Next Colonoscopy: 10 years.  Dexa:  NA    Health maintenance updated:  yes    HISTORY:  OB History    Para Term  AB Living   4 3 3 0 1 3   SAB TAB Ectopic Multiple Live Births   1 0 0 0 3      # Outcome Date GA Lbr Jose/2nd Weight Sex Delivery Anes PTL Lv   4 SAB            3 Term         JENA   2 Term         JENA   1 Term         JENA       Patient Active Problem List   Diagnosis     Allergic rhinitis due to pollen     Atrophic vaginitis     BRCA gene positive     Lichen sclerosus     Family history of ovarian cancer     Worried well     Advanced care planning/counseling discussion     Past Surgical History:   Procedure Laterality Date     GYN SURGERY      BSO     KNEE SURGERY       LAPAROSCOPIC HYSTERECTOMY TOTAL      LTH - No cervix     LAPAROSCOPY      [facility] for pelvic mass. Bx benign     MASTECTOMY, BILATERAL      prophylactic with reconstruction with saline implants  "due to BR Ca +       Social History     Tobacco Use     Smoking status: Former Smoker     Smokeless tobacco: Never Used   Substance Use Topics     Alcohol use: Yes     Alcohol/week: 0.0 oz     Comment: Rare       Problem (# of Occurrences) Relation (Name,Age of Onset)    Colon Cancer (1) Other: Uncle    Lung Cancer (1) Father (62)    Ovarian Cancer (1) Sister (54): Another sister with tumor of low malignant potential    Uterine Cancer (1) Other (62): aunt            Current Outpatient Medications   Medication Sig     EPINEPHrine (EPIPEN) 0.3 MG/0.3ML injection Inject 0.3 mLs (0.3 mg) into the muscle once as needed for anaphylaxis     estradiol (ESTRACE VAGINAL) 0.1 MG/GM cream Place 1 g vaginally twice a week     [START ON 5/9/2019] estradiol (VAGIFEM) 10 MCG TABS vaginal tablet Place 1 tablet (10 mcg) vaginally twice a week     multivitamin, therapeutic with minerals (MULTI-VITAMIN) TABS Take 1 tablet by mouth daily     Polyethylene Glycol 3350 (MIRALAX PO)      ranitidine (ZANTAC) 150 MG tablet Take by mouth 2 times daily     sertraline (ZOLOFT) 50 MG tablet Take 25 mg by mouth     No current facility-administered medications for this visit.      Allergies   Allergen Reactions     Diagnostic X-Ray Materials Hives     Hives to CT contrast. Ok with premedication.     Contrast Dye      CT     Penicillins      Sulfa Drugs        Past medical, surgical, social and family histories were reviewed and updated in EPIC.    ROS:   12 point review of systems negative other than symptoms noted below.    EXAM:  /64   Pulse 74   Ht 1.619 m (5' 3.75\")   Wt 63.4 kg (139 lb 12.8 oz)   Breastfeeding? No   BMI 24.19 kg/m     BMI: Body mass index is 24.19 kg/m .    PHYSICAL EXAM:  Constitutional:  Appearance: Well nourished, well developed, alert, in no acute distress  Neck:  Lymph Nodes:  No lymphadenopathy present    Thyroid:  Gland size normal, nontender, no nodules or masses present  on palpation  Chest:  Respiratory " Effort:  Breathing unlabored  Cardiovascular:    Heart: Auscultation:  Regular rate, normal rhythm, no murmurs present  Breasts: S/P bilateral mastectomy and reconstruction  Gastrointestinal:   Abdominal Examination:  Abdomen nontender to palpation, tone normal without rigidity or guarding, no masses present, umbilicus without lesions   Liver and Spleen:  No hepatomegaly present, liver nontender to palpation    Hernias:  No hernias present  Lymphatic: Lymph Nodes:  No other lymphadenopathy present  Skin:  General Inspection:  No rashes present, no lesions present, no areas of  discoloration    Genitalia and Groin:  No rashes present, no lesions present, no areas of  discoloration, no masses present  Neurologic/Psychiatric:    Mental Status:  Oriented X3     Pelvic Exam:  External Genitalia:     Normal appearance for age, no discharge present, no tenderness present, no inflammatory lesions present, color normal  Vagina:     Normal vaginal vault without central or paravaginal defects, no discharge present, no inflammatory lesions present, no masses present  Bladder:     Nontender to palpation  Urethra:   Urethral Body:  Urethra palpation normal, urethra structural support normal   Urethral Meatus:  No erythema or lesions present  Cervix:     Surgically absent  Uterus:     Surgically absent  Adnexa:     Surgically absent  Perineum:     Perineum within normal limits, no evidence of trauma, no rashes or skin lesions present  Anus:     Anus within normal limits, no hemorrhoids present  Inguinal Lymph Nodes:     No lymphadenopathy present    COUNSELING:   Reviewed preventive health counseling, as reflected in patient instructions       Regular exercise    BMI: Body mass index is 24.19 kg/m .      ASSESSMENT:  53 year old female with satisfactory annual exam.    ICD-10-CM    1. Encounter for gynecological examination without abnormal finding Z01.419    2. Atrophic vaginitis N95.2 estradiol (VAGIFEM) 10 MCG TABS vaginal  tablet   3. Family history of ovarian cancer Z80.41    4. BRCA gene positive Z15.01     Z15.09        PLAN:  Rx vagifem.     Osvaldo Bryant MD

## 2019-05-07 ENCOUNTER — OFFICE VISIT (OUTPATIENT)
Dept: OBGYN | Facility: CLINIC | Age: 54
End: 2019-05-07
Payer: COMMERCIAL

## 2019-05-07 VITALS
HEIGHT: 64 IN | SYSTOLIC BLOOD PRESSURE: 110 MMHG | WEIGHT: 139.8 LBS | HEART RATE: 74 BPM | BODY MASS INDEX: 23.87 KG/M2 | DIASTOLIC BLOOD PRESSURE: 64 MMHG

## 2019-05-07 DIAGNOSIS — Z80.41 FAMILY HISTORY OF OVARIAN CANCER: ICD-10-CM

## 2019-05-07 DIAGNOSIS — N95.2 ATROPHIC VAGINITIS: ICD-10-CM

## 2019-05-07 DIAGNOSIS — Z01.419 ENCOUNTER FOR GYNECOLOGICAL EXAMINATION WITHOUT ABNORMAL FINDING: Primary | ICD-10-CM

## 2019-05-07 DIAGNOSIS — Z15.01 BRCA GENE POSITIVE: ICD-10-CM

## 2019-05-07 DIAGNOSIS — Z15.09 BRCA GENE POSITIVE: ICD-10-CM

## 2019-05-07 PROCEDURE — 99396 PREV VISIT EST AGE 40-64: CPT | Performed by: OBSTETRICS & GYNECOLOGY

## 2019-05-07 RX ORDER — ESTRADIOL 10 UG/1
10 INSERT VAGINAL
Qty: 24 TABLET | Refills: 3 | Status: SHIPPED | OUTPATIENT
Start: 2019-05-09 | End: 2020-05-08

## 2019-05-07 RX ORDER — ESTRADIOL 0.1 MG/G
1 CREAM VAGINAL
Qty: 42.5 G | Refills: 1 | Status: CANCELLED | OUTPATIENT
Start: 2019-05-09

## 2019-05-07 ASSESSMENT — ANXIETY QUESTIONNAIRES
GAD7 TOTAL SCORE: 1
2. NOT BEING ABLE TO STOP OR CONTROL WORRYING: NOT AT ALL
6. BECOMING EASILY ANNOYED OR IRRITABLE: NOT AT ALL
1. FEELING NERVOUS, ANXIOUS, OR ON EDGE: NOT AT ALL
IF YOU CHECKED OFF ANY PROBLEMS ON THIS QUESTIONNAIRE, HOW DIFFICULT HAVE THESE PROBLEMS MADE IT FOR YOU TO DO YOUR WORK, TAKE CARE OF THINGS AT HOME, OR GET ALONG WITH OTHER PEOPLE: NOT DIFFICULT AT ALL
3. WORRYING TOO MUCH ABOUT DIFFERENT THINGS: SEVERAL DAYS
7. FEELING AFRAID AS IF SOMETHING AWFUL MIGHT HAPPEN: NOT AT ALL
5. BEING SO RESTLESS THAT IT IS HARD TO SIT STILL: NOT AT ALL

## 2019-05-07 ASSESSMENT — PATIENT HEALTH QUESTIONNAIRE - PHQ9
SUM OF ALL RESPONSES TO PHQ QUESTIONS 1-9: 1
5. POOR APPETITE OR OVEREATING: NOT AT ALL

## 2019-05-07 ASSESSMENT — MIFFLIN-ST. JEOR: SCORE: 1215.16

## 2019-05-08 ASSESSMENT — ANXIETY QUESTIONNAIRES: GAD7 TOTAL SCORE: 1

## 2019-08-12 ENCOUNTER — NURSE TRIAGE (OUTPATIENT)
Dept: NURSING | Facility: CLINIC | Age: 54
End: 2019-08-12

## 2019-08-12 NOTE — TELEPHONE ENCOUNTER
Nichole wanted to see her OB/GYN provider more for reassurance that her low GI issues are not related to perineal cancer. She had a total hysterectomy at age 38 yrs of age because two of her sisters had ovarian cancer.  Nichole's symptoms are chronic constipation and diarrhea sometimes both in the same day. She'll have normal stools too.  She has low abdominal discomfort and rumbling. She has found she can somewhat control her symptoms with daily Miralax.  I told her that this doesn't sound like a GYN issue but more a GI issue.    Nichole already has an appointment on 8/22/2019 with a colorectal provider.   This sounded like a good place to start, which I told her.  She felt better by the end of our call. She understands to call back with further questions/concerns.  I told her too that if her symptoms change eg become more acute before her appointment on the 15th, getting in to see her pcp would be recommended.  She stated understanding of all the above.  Beth BATRES RN [facility]

## 2019-08-15 ENCOUNTER — TRANSFERRED RECORDS (OUTPATIENT)
Dept: HEALTH INFORMATION MANAGEMENT | Facility: CLINIC | Age: 54
End: 2019-08-15

## 2019-09-23 DIAGNOSIS — N95.2 ATROPHIC VAGINITIS: ICD-10-CM

## 2019-09-24 RX ORDER — ESTRADIOL 0.1 MG/G
CREAM VAGINAL
Qty: 42.5 G | Refills: 0 | Status: SHIPPED | OUTPATIENT
Start: 2019-09-24 | End: 2020-07-07

## 2019-09-24 NOTE — TELEPHONE ENCOUNTER
"Requested Prescriptions   Pending Prescriptions Disp Refills     ESTRACE VAGINAL 0.1 MG/GM vaginal cream [Pharmacy Med Name: ESTRACE VAGINAL CREAM 42.5GM] 42.5 g 0     Sig: PLACE 1 GRAM VAGINALLY 2 TIMES A WEEK       Hormone Replacement Therapy Failed - 9/23/2019  9:36 PM        Failed - Patient has mammogram in past 2 years on file if age 50-75        Passed - Blood pressure under 140/90 in past 12 months     BP Readings from Last 3 Encounters:   05/07/19 110/64   11/02/18 110/60   05/08/18 110/68                 Passed - Recent (12 mo) or future (30 days) visit within the authorizing provider's specialty     Patient had office visit in the last 12 months or has a visit in the next 30 days with authorizing provider or within the authorizing provider's specialty.  See \"Patient Info\" tab in inbasket, or \"Choose Columns\" in Meds & Orders section of the refill encounter.              Passed - Medication is active on med list        Passed - Patient is 18 years of age or older        Passed - No active pregnancy on record        Passed - No positive pregnancy test on record in past 12 months        Last Written Prescription Date:  5/10/18  Last Fill Quantity: 42.5g,  # refills: 0   Last office visit: 5/7/2019 with prescribing provider:  Annette   Future Office Visit:   Next 5 appointments (look out 90 days)    Nov 05, 2019  9:30 AM CST  Office Visit with Osvaldo Bryant MD  [facility] [place] ([facility]) [address] [place] [id number] [phone]           Medication is being filled for 1 time refill only due to: appointment scheduled to discuss further refills    Christy Alberto, RN on 9/24/2019 at 8:37 AM    "

## 2019-09-27 ENCOUNTER — TRANSFERRED RECORDS (OUTPATIENT)
Dept: HEALTH INFORMATION MANAGEMENT | Facility: CLINIC | Age: 54
End: 2019-09-27

## 2019-11-05 ENCOUNTER — OFFICE VISIT (OUTPATIENT)
Dept: OBGYN | Facility: CLINIC | Age: 54
End: 2019-11-05
Payer: COMMERCIAL

## 2019-11-05 VITALS
WEIGHT: 140.2 LBS | HEIGHT: 64 IN | BODY MASS INDEX: 23.93 KG/M2 | SYSTOLIC BLOOD PRESSURE: 100 MMHG | HEART RATE: 63 BPM | DIASTOLIC BLOOD PRESSURE: 60 MMHG

## 2019-11-05 DIAGNOSIS — Z15.01 BRCA GENE POSITIVE: ICD-10-CM

## 2019-11-05 DIAGNOSIS — Z80.41 FAMILY HISTORY OF OVARIAN CANCER: ICD-10-CM

## 2019-11-05 DIAGNOSIS — Z15.09 BRCA GENE POSITIVE: ICD-10-CM

## 2019-11-05 DIAGNOSIS — Z12.39 ENCOUNTER FOR SPECIAL SCREENING EXAMINATION FOR NEOPLASM OF BREAST: Primary | ICD-10-CM

## 2019-11-05 PROCEDURE — 99213 OFFICE O/P EST LOW 20 MIN: CPT | Performed by: OBSTETRICS & GYNECOLOGY

## 2019-11-05 ASSESSMENT — MIFFLIN-ST. JEOR: SCORE: 1215.94

## 2019-11-05 NOTE — PROGRESS NOTES
SUBJECTIVE:                                                   Nichole Butler is a 54 year old female who presents to clinic today for the following health issue(s):  Patient presents with:  Breast Exam: bi-manual breast exam. No current problems or concerns.       Additional information:     HPI:  No breast concerns. Feels good.     No LMP recorded. Patient has had a hysterectomy..     Patient is sexually active, .  Using hysterectomy for contraception.    reports that she has quit smoking. She has never used smokeless tobacco.    STD testing offered?  Declined    Health maintenance updated:  no    Today's PHQ-2 Score:   PHQ-2 (  [employer]) 2019   Q1: Little interest or pleasure in doing things 0   Q2: Feeling down, depressed or hopeless 0   PHQ-2 Score 0     Today's PHQ-9 Score:   PHQ-9 SCORE 2019   PHQ-9 Total Score 1     Today's DAMION-7 Score:   DAMION-7 SCORE 2019   Total Score 1       Problem list and histories reviewed & adjusted, as indicated.  Additional history: as documented.    Patient Active Problem List   Diagnosis     Allergic rhinitis due to pollen     Atrophic vaginitis     BRCA gene positive     Lichen sclerosus     Family history of ovarian cancer     Worried well     Advanced care planning/counseling discussion     Past Surgical History:   Procedure Laterality Date     GYN SURGERY      BSO     KNEE SURGERY       LAPAROSCOPIC HYSTERECTOMY TOTAL      LTH - No cervix     LAPAROSCOPY      [facility] for pelvic mass. Bx benign     MASTECTOMY, BILATERAL      prophylactic with reconstruction with saline implants due to BR Ca +       Social History     Tobacco Use     Smoking status: Former Smoker     Smokeless tobacco: Never Used   Substance Use Topics     Alcohol use: Yes     Alcohol/week: 0.0 standard drinks     Comment: Rare       Problem (# of Occurrences) Relation (Name,Age of Onset)    Colon Cancer (1) Other: Uncle    Lung Cancer (1) Father (62)    Ovarian Cancer (1) Sister (54):  "Another sister with tumor of low malignant potential    Uterine Cancer (1) Other (62): aunt            Current Outpatient Medications   Medication Sig     ESTRACE VAGINAL 0.1 MG/GM vaginal cream PLACE 1 GRAM VAGINALLY 2 TIMES A WEEK     estradiol (VAGIFEM) 10 MCG TABS vaginal tablet Place 1 tablet (10 mcg) vaginally twice a week     multivitamin, therapeutic with minerals (MULTI-VITAMIN) TABS Take 1 tablet by mouth daily     Polyethylene Glycol 3350 (MIRALAX PO)      ranitidine (ZANTAC) 150 MG tablet Take by mouth 2 times daily     sertraline (ZOLOFT) 50 MG tablet Take 25 mg by mouth     EPINEPHrine (EPIPEN) 0.3 MG/0.3ML injection Inject 0.3 mLs (0.3 mg) into the muscle once as needed for anaphylaxis     No current facility-administered medications for this visit.      Allergies   Allergen Reactions     Diagnostic X-Ray Materials Hives     Hives to CT contrast. Ok with premedication.     Contrast Dye      CT     Penicillins      Sulfa Drugs        ROS:  12 point review of systems negative other than symptoms noted below.    OBJECTIVE:     /60 (BP Location: Right arm, Patient Position: Chair, Cuff Size: Adult Regular)   Pulse 63   Ht 1.626 m (5' 4\")   Wt 63.6 kg (140 lb 3.2 oz)   BMI 24.07 kg/m    Body mass index is 24.07 kg/m .    Exam:  Constitutional:  Appearance: Well nourished, well developed alert, in no acute distress  Breasts:  S/P bilateral mastectomies and reconstruction. No masses.  Axillary Lymph Nodes:  No lymphadenopathy present  Neurologic:  Mental Status:  Oriented X3.  Normal strength and tone, sensory exam grossly normal, mentation intact and speech normal.    Psychiatric:  Mentation appears normal and affect normal/bright.     In-Clinic Test Results:  No results found for this or any previous visit (from the past 24 hour(s)).    ASSESSMENT/PLAN:                                                        ICD-10-CM    1. Encounter for special screening examination for neoplasm of breast Z12.39  "   2. Family history of ovarian cancer Z80.41    3. BRCA gene positive Z15.01     Z15.09        RTC in May for annual exam    Osvaldo Bryant MD  [facility] [place]

## 2019-11-05 NOTE — NURSING NOTE
"Chief Complaint   Patient presents with     Breast Exam     bi-manual breast exam. No current problems or concerns.        Initial /60 (BP Location: Right arm, Patient Position: Chair, Cuff Size: Adult Regular)   Pulse 63   Ht 1.626 m (5' 4\")   Wt 63.6 kg (140 lb 3.2 oz)   BMI 24.07 kg/m   Estimated body mass index is 24.07 kg/m  as calculated from the following:    Height as of this encounter: 1.626 m (5' 4\").    Weight as of this encounter: 63.6 kg (140 lb 3.2 oz).  BP completed using cuff size: regular    Questioned patient about current smoking habits.  Pt. has never smoked.          The following HM Due: NONE    Eve Morley CMA               "

## 2020-04-20 ENCOUNTER — TELEPHONE (OUTPATIENT)
Dept: OBGYN | Facility: CLINIC | Age: 55
End: 2020-04-20

## 2020-04-20 NOTE — TELEPHONE ENCOUNTER
Patient notified of Dr. Bryant's comments.  Pt verbalized understanding, in agreement with plan, and voiced no further questions.  Christy Alberto RN on 4/20/2020 at 12:50 PM

## 2020-04-20 NOTE — TELEPHONE ENCOUNTER
Most things we are concerned about are white and raised. Since 2 people have looked at her perineum I'm not overly concerned at this point. Would observe for now.

## 2020-04-20 NOTE — TELEPHONE ENCOUNTER
"Since December has been using twice a week clobetasol as recommended from the dermatolgist.  The biospy negative for lichen sclerosis.    Also saw Dr. Betito Hatfield  A gyn onc who knows her family history and his visual exam he thought the labial area was mild lichen.   Okayed to continue treatment.    Her main concern is in having to \"get up close and personal\" with her perineal area,  she has noticed a darken red area where her episiotomies were.  She is not sure if it has always been that way..  Wondering if this is normal or if she needs to have and exam to seem if this is a change.    Routing to provider to advise.    Christy Alberto RN on 4/20/2020 at 12:09 PM      "

## 2020-04-20 NOTE — TELEPHONE ENCOUNTER
Pt is having an issue and has been having to apply  A cream . She is concerned about that  her genital health  Is ok . [phone]

## 2020-04-24 ENCOUNTER — TELEPHONE (OUTPATIENT)
Dept: OBGYN | Facility: CLINIC | Age: 55
End: 2020-04-24

## 2020-04-24 NOTE — TELEPHONE ENCOUNTER
Pt states she is now seeing a raised area on her vagina. Cannot wait until July to see Dr. Bryant. Using her cream as prescribed. Transferred to scheduling.   Jaky Laboy RN on 4/24/2020 at 8:42 AM

## 2020-04-24 NOTE — PROGRESS NOTES
SUBJECTIVE:                                                   Nichole Butler is a 54 year old female who presents to clinic today for the following health issue(s):  Patient presents with:  Mass: Patient was given clobetasol by dermatologist. A small bulge on the back of the vaginal opening. Slight inflamation in vaginal area.         HPI:  Patient had a vulvar biopsy by derm in  that was negative. Only showed some lichenification but not LSA  Also had gyn onc exam since sisters had ovarian cancer and she worries a lot. They didn't see anything either.   She uses clobetasol but no itching or symptoms.   Got worried after looking at vulva with a mirror.   Has a tiny urethral caruncle.   White changes have resolved.     + BRCA 2, had bilateral mastectomies    No LMP recorded. Patient has had a hysterectomy..     Patient is sexually active, .  Using hysterectomy for contraception.    reports that she has quit smoking. She has never used smokeless tobacco.    STD testing offered?  Declined    Health maintenance updated:  yes    Today's PHQ-2 Score:   PHQ-2 (  [employer]) 2020   Q1: Little interest or pleasure in doing things 0   Q2: Feeling down, depressed or hopeless 0   PHQ-2 Score 0     Today's PHQ-9 Score:   PHQ-9 SCORE 2019   PHQ-9 Total Score 1     Today's DAMION-7 Score:   DAMION-7 SCORE 2019   Total Score 1       Problem list and histories reviewed & adjusted, as indicated.  Additional history: as documented.    Patient Active Problem List   Diagnosis     Allergic rhinitis due to pollen     Atrophic vaginitis     BRCA gene positive     Lichen sclerosus     Family history of ovarian cancer     Worried well     Advanced care planning/counseling discussion     Past Surgical History:   Procedure Laterality Date     GYN SURGERY      BSO     KNEE SURGERY       LAPAROSCOPIC HYSTERECTOMY TOTAL      LTH - No cervix     LAPAROSCOPY      [facility] for pelvic mass. Bx benign     MASTECTOMY, BILATERAL  "     prophylactic with reconstruction with saline implants due to BR Ca +       Social History     Tobacco Use     Smoking status: Former Smoker     Smokeless tobacco: Never Used   Substance Use Topics     Alcohol use: Yes     Alcohol/week: 0.0 standard drinks     Comment: Rare       Problem (# of Occurrences) Relation (Name,Age of Onset)    Colon Cancer (1) Other: Uncle    Lung Cancer (1) Father (62)    Ovarian Cancer (1) Sister (54): Another sister with tumor of low malignant potential    Uterine Cancer (1) Other (62): aunt            Current Outpatient Medications   Medication Sig     clobetasol (TEMOVATE) 0.05 % external ointment APPLY TO VULVA TWICE WEEKLY     EPINEPHrine (EPIPEN) 0.3 MG/0.3ML injection Inject 0.3 mLs (0.3 mg) into the muscle once as needed for anaphylaxis     ESTRACE VAGINAL 0.1 MG/GM vaginal cream PLACE 1 GRAM VAGINALLY 2 TIMES A WEEK     estradiol (VAGIFEM) 10 MCG TABS vaginal tablet Place 1 tablet (10 mcg) vaginally twice a week     multivitamin, therapeutic with minerals (MULTI-VITAMIN) TABS Take 1 tablet by mouth daily     Polyethylene Glycol 3350 (MIRALAX PO)      sertraline (ZOLOFT) 25 MG tablet      NONFORMULARY APPLY TO THE PERIANAL AREA  2 TO 3 TIMES DAILY AS DIRECTED     No current facility-administered medications for this visit.      Allergies   Allergen Reactions     Diagnostic X-Ray Materials Hives     Hives to CT contrast. Ok with premedication.     Contrast Dye      CT     Penicillins      Sulfa Drugs        ROS:  12 point review of systems negative other than symptoms noted below or in the HPI.  Genitourinary: bulge in vaginal area  No urinary frequency or dysuria, bladder or kidney problems      OBJECTIVE:     /68 (BP Location: Right arm, Patient Position: Sitting, Cuff Size: Adult Regular)   Pulse 68   Ht 1.626 m (5' 4\")   Wt 62.6 kg (138 lb)   Breastfeeding No   BMI 23.69 kg/m    Body mass index is 23.69 kg/m .    Exam:  Constitutional:  Appearance: Well " nourished, well developed alert, in no acute distress  Chest:  Respiratory Effort:  Breathing unlabored. Clear to auscultation bilaterally.   Psychiatric:  Mentation appears normal and affect normal/bright.  Pelvic Exam:  External Genitalia:     Normal appearance for age, no discharge present, no tenderness present, no inflammatory lesions present, color normal  Vagina:     Normal vaginal vault without central or paravaginal defects, ATROPHIC  Bladder:     Nontender to palpation  Urethra:   Urethral Body:  Urethra palpation normal, urethra structural support normal   Urethral Meatus:  No erythema or lesions present  Cervix:     Appearance healthy, no lesions present, nontender to palpation, no bleeding present  Uterus:     Nontender to palpation, no masses present, position anteflexed, mobility: normal  Adnexa:     No adnexal tenderness present, no adnexal masses present  Perineum:     Perineum within normal limits, no evidence of trauma, no rashes or skin lesions present  Inguinal Lymph Nodes:     No lymphadenopathy present       In-Clinic Test Results:  No results found for this or any previous visit (from the past 24 hour(s)).    ASSESSMENT/PLAN:                                                        ICD-10-CM    1. Atrophic vaginitis  N95.2        Patient has atrophic change but no longer signs of lichenification  No ulcers, plaques or papules  No sign of vulvar lesion that requires a biopsy  Reassured patient  Reviewed her records  Discussed urethral caruncle which is tiny and not bleeding    Face to face 15 minute, greater than 50% counseling    Soumya Fallon MD  [facility] [place]

## 2020-04-24 NOTE — TELEPHONE ENCOUNTER
Patient spoke to Triage this week, but has some new concerns and would like to speak to triage again. Please call back today.

## 2020-04-27 ENCOUNTER — OFFICE VISIT (OUTPATIENT)
Dept: OBGYN | Facility: CLINIC | Age: 55
End: 2020-04-27
Payer: COMMERCIAL

## 2020-04-27 VITALS
SYSTOLIC BLOOD PRESSURE: 112 MMHG | BODY MASS INDEX: 23.56 KG/M2 | WEIGHT: 138 LBS | DIASTOLIC BLOOD PRESSURE: 68 MMHG | HEIGHT: 64 IN | HEART RATE: 68 BPM

## 2020-04-27 DIAGNOSIS — N95.2 ATROPHIC VAGINITIS: Primary | ICD-10-CM

## 2020-04-27 PROCEDURE — 99213 OFFICE O/P EST LOW 20 MIN: CPT | Performed by: OBSTETRICS & GYNECOLOGY

## 2020-04-27 RX ORDER — SERTRALINE HYDROCHLORIDE 25 MG/1
TABLET, FILM COATED ORAL
COMMUNITY
Start: 2020-04-12

## 2020-04-27 RX ORDER — CLOBETASOL PROPIONATE 0.5 MG/G
OINTMENT TOPICAL
COMMUNITY
Start: 2019-12-17 | End: 2020-12-10

## 2020-04-27 ASSESSMENT — MIFFLIN-ST. JEOR: SCORE: 1205.96

## 2020-05-07 DIAGNOSIS — N95.2 ATROPHIC VAGINITIS: ICD-10-CM

## 2020-05-08 RX ORDER — ESTRADIOL 10 UG/1
INSERT VAGINAL
Qty: 24 TABLET | Refills: 0 | Status: SHIPPED | OUTPATIENT
Start: 2020-05-08 | End: 2020-07-07

## 2020-05-08 NOTE — TELEPHONE ENCOUNTER
"Requested Prescriptions   Pending Prescriptions Disp Refills     estradiol (VAGIFEM) 10 MCG TABS vaginal tablet [Pharmacy Med Name: ESTRADIOL 10MCG VAGINAL TABS 18S] 24 tablet 3     Sig: INSERT 1 TABLET(10 MCG) VAGINALLY 2 TIMES A WEEK       Hormone Replacement Therapy Failed - 5/7/2020 10:05 PM        Failed - Patient has mammogram in past 2 years on file if age 50-75        Passed - Blood pressure under 140/90 in past 12 months     BP Readings from Last 3 Encounters:   04/27/20 112/68   11/05/19 100/60   05/07/19 110/64                 Passed - Recent (12 mo) or future (30 days) visit within the authorizing provider's specialty     Patient has had an office visit with the authorizing provider or a provider within the authorizing providers department within the previous 12 mos or has a future within next 30 days. See \"Patient Info\" tab in inbasket, or \"Choose Columns\" in Meds & Orders section of the refill encounter.              Passed - Medication is active on med list        Passed - Patient is 18 years of age or older        Passed - No active pregnancy on record        Passed - No positive pregnancy test on record in past 12 months           Refill sent  Jaky Laboy RN on 5/8/2020 at 8:29 AM    "

## 2020-06-15 ENCOUNTER — VIRTUAL VISIT (OUTPATIENT)
Dept: OBGYN | Facility: CLINIC | Age: 55
End: 2020-06-15
Payer: COMMERCIAL

## 2020-06-15 ENCOUNTER — NURSE TRIAGE (OUTPATIENT)
Dept: NURSING | Facility: CLINIC | Age: 55
End: 2020-06-15

## 2020-06-15 DIAGNOSIS — N95.2 ATROPHIC VAGINITIS: ICD-10-CM

## 2020-06-15 DIAGNOSIS — N30.01 ACUTE CYSTITIS WITH HEMATURIA: Primary | ICD-10-CM

## 2020-06-15 DIAGNOSIS — N94.10 DYSPAREUNIA, FEMALE: ICD-10-CM

## 2020-06-15 LAB
ALBUMIN UR-MCNC: ABNORMAL MG/DL
APPEARANCE UR: ABNORMAL
BILIRUB UR QL STRIP: NEGATIVE
COLOR UR AUTO: ABNORMAL
GLUCOSE UR STRIP-MCNC: NEGATIVE MG/DL
HGB UR QL STRIP: ABNORMAL
KETONES UR STRIP-MCNC: NEGATIVE MG/DL
LEUKOCYTE ESTERASE UR QL STRIP: ABNORMAL
NITRATE UR QL: NEGATIVE
PH UR STRIP: 6.5 PH (ref 5–7)
SOURCE: ABNORMAL
SP GR UR STRIP: 1.01 (ref 1–1.03)
UROBILINOGEN UR STRIP-ACNC: 0.2 EU/DL (ref 0.2–1)

## 2020-06-15 PROCEDURE — 81003 URINALYSIS AUTO W/O SCOPE: CPT | Performed by: OBSTETRICS & GYNECOLOGY

## 2020-06-15 PROCEDURE — 99213 OFFICE O/P EST LOW 20 MIN: CPT | Mod: 95 | Performed by: OBSTETRICS & GYNECOLOGY

## 2020-06-15 PROCEDURE — 87086 URINE CULTURE/COLONY COUNT: CPT | Performed by: OBSTETRICS & GYNECOLOGY

## 2020-06-15 RX ORDER — CIPROFLOXACIN 250 MG/1
250 TABLET, FILM COATED ORAL 2 TIMES DAILY
Qty: 14 TABLET | Refills: 0 | Status: SHIPPED | OUTPATIENT
Start: 2020-06-15 | End: 2020-07-07

## 2020-06-15 NOTE — RESULT ENCOUNTER NOTE
Dear Nichole,   The UA is suspicious for UTI. Will culture to be sure and find out what bacteria is growing.      Osvaldo Bryant MD

## 2020-06-15 NOTE — PROGRESS NOTES
"Nichole Butler is a 54 year old female who is being evaluated via a billable telephone visit.      The patient has been notified of following:     \"This telephone visit will be conducted via a call between you and your physician/provider. We have found that certain health care needs can be provided without the need for a physical exam.  This service lets us provide the care you need with a short phone conversation.  If a prescription is necessary we can send it directly to your pharmacy.  If lab work is needed we can place an order for that and you can then stop by our lab to have the test done at a later time.    Telephone visits are billed at different rates depending on your insurance coverage. During this emergency period, for some insurers they may be billed the same as an in-person visit.  Please reach out to your insurance provider with any questions.    If during the course of the call the physician/provider feels a telephone visit is not appropriate, you will not be charged for this service.\"    Patient has given verbal consent for Telephone visit?  Yes    What phone number would you like to be contacted at? [phone]    How would you like to obtain your AVS? [employer]    Phone call duration: 14 minutes      SUBJECTIVE:                                                   Nichole Butler is a 54 year old female who presents to clinic today for the following health issue(s):  Patient presents with:  Urinary Problem: c/o dysuria, urgency, and blood in urine, states had uti 5 weeks ago and was on antibiotic Macrobid. Happens after intercourse        HPI:  Had UTI and treated through her PCP 5 weeks ago. Empiric treatment with Macrobid. Pt only having UTI once a year. Using  vaginal estrogen.  Symptoms improved. Had to help move son out of state. No intercourse for 3 weeks. Then had IC this weekend. Uncomfortable from atrophic changes. Then developed dysuria, hematuria, frequency.   Wonders about pain and " causing UTI.  Also has some urinary frequency in general and wondering about that.       No LMP recorded. Patient has had a hysterectomy..     Patient is sexually active, .  Using hysterectomy for contraception.    reports that she has quit smoking. She has never used smokeless tobacco.    STD testing offered?  Declined    Health maintenance updated:  yes    Problem list and histories reviewed & adjusted, as indicated.  Additional history: as documented.    Patient Active Problem List   Diagnosis     Allergic rhinitis due to pollen     Atrophic vaginitis     BRCA gene positive     Lichen sclerosus     Family history of ovarian cancer     Worried well     Advanced care planning/counseling discussion     Past Surgical History:   Procedure Laterality Date     GYN SURGERY      BSO     KNEE SURGERY       LAPAROSCOPIC HYSTERECTOMY TOTAL      LTH - No cervix     [facility]OSCOPY      [facility]. Bx benign     MASTECTOMY, BILATERAL      prophylactic with reconstruction with saline implants due to BR Ca +       Social History     Tobacco Use     Smoking status: Former Smoker     Smokeless tobacco: Never Used   Substance Use Topics     Alcohol use: Yes     Alcohol/week: 0.0 standard drinks     Comment: Rare       Problem (# of Occurrences) Relation (Name,Age of Onset)    Colon Cancer (1) Other: Uncle    Lung Cancer (1) Father (62)    No Known Problems (5) Mother, Brother, Maternal Grandmother, Maternal Grandfather, Paternal Grandmother    Ovarian Cancer (1) Sister (54): Another sister with tumor of low malignant potential    Uterine Cancer (1) Other (62): aunt            Current Outpatient Medications   Medication Sig     ciprofloxacin (CIPRO) 250 MG tablet Take 1 tablet (250 mg) by mouth 2 times daily     clobetasol (TEMOVATE) 0.05 % external ointment APPLY TO VULVA TWICE WEEKLY     ESTRACE VAGINAL 0.1 MG/GM vaginal cream PLACE 1 GRAM VAGINALLY 2 TIMES A WEEK     estradiol (VAGIFEM) 10 MCG TABS vaginal  tablet INSERT 1 TABLET(10 MCG) VAGINALLY 2 TIMES A WEEK     multivitamin, therapeutic with minerals (MULTI-VITAMIN) TABS Take 1 tablet by mouth daily     Polyethylene Glycol 3350 (MIRALAX PO)      sertraline (ZOLOFT) 25 MG tablet      EPINEPHrine (EPIPEN) 0.3 MG/0.3ML injection Inject 0.3 mLs (0.3 mg) into the muscle once as needed for anaphylaxis     NONFORMULARY APPLY TO THE PERIANAL AREA  2 TO 3 TIMES DAILY AS DIRECTED     No current facility-administered medications for this visit.      Allergies   Allergen Reactions     Diagnostic X-Ray Materials Hives     Hives to CT contrast. Ok with premedication.     Contrast Dye      CT     Penicillins      Sulfa Drugs        ROS:        OBJECTIVE:     There were no vitals taken for this visit.  There is no height or weight on file to calculate BMI.    Exam:       In-Clinic Test Results:  Results for orders placed or performed in visit on 06/15/20 (from the past 24 hour(s))   UA without Microscopic   Result Value Ref Range    Color Urine Pink     Appearance Urine Slightly Cloudy     Glucose Urine Negative NEG^Negative mg/dL    Bilirubin Urine Negative NEG^Negative    Ketones Urine Negative NEG^Negative mg/dL    Specific Gravity Urine 1.015 1.003 - 1.035    Blood Urine 3+ (A) NEG^Negative    pH Urine 6.5 5.0 - 7.0 pH    Protein Albumin Urine Trace (A) NEG^Negative mg/dL    Urobilinogen Urine 0.2 0.2 - 1.0 EU/dL    Nitrite Urine Negative NEG^Negative    Leukocyte Esterase Urine 2+ (A) NEG^Negative    Source Midstream Urine        ASSESSMENT/PLAN:                                                        ICD-10-CM    1. Acute cystitis with hematuria  N30.01 UA without Microscopic     Urine Culture Aerobic Bacterial     ciprofloxacin (CIPRO) 250 MG tablet   2. Dyspareunia, female  N94.10    3. Atrophic vaginitis  N95.2            Discussed symptoms now. Likely UTI. Would treat with Cipro. Unsure if recurrence or persistence. Travel and dehydration and then IC may have started  things again.   Discussed dyspareunia. This is nothing new. It's not the reason for the UTI.   Discussed frequency and changes with age and estrogen. Discussed meds in future if needed.   Get UA today and culture.  Rx Cipro and call with UC results.         Osvaldo Bryant MD  [facility] [place]

## 2020-06-15 NOTE — TELEPHONE ENCOUNTER
Pt reports urinary symptoms x 2 hours:  - malodorous urine  - frequency  - burning pain with urination  - hematuria    Pt reports history of frequent UTi.  Often associated with intercourse.  Pt has done typical self-care measures -> urinating frequently, fluids.  However chronic atrophic vaginitis appears to trigger UTis following intercourse.    Recent air travel.  Went to [place], [place].  Now home x 7 days.  NO symptoms of coronavirus.  No sick household members.    Pt agrees to clinical eval today per triage disposition.  Hopes for Macrobid Rx.  Warm transferred to a  for an appointment now.    [facility] Nurse Advisor   ______________________________    Add'l info re precautionary measures per current covid protocols:  COVID 19 Nurse Triage Plan/Patient Instructions    Please be aware that novel coronavirus (COVID-19) may be circulating in the community. If you develop symptoms such as fever, cough, or SOB or if you have concerns about the presence of another infection including coronavirus (COVID-19), please contact your health care provider or visit [email or web address].     Disposition/Instructions    Additional COVID19 information to add for patients.   How can I protect others?  If you have symptoms (fever, cough, body aches or trouble breathing): Stay home and away from others (self-isolate) until:    At least 10 days have passed since your symptoms started. And     You ve had no fever--and no medicine that reduces fever--for 3 full days (72 hours). And      Your other symptoms have resolved (gotten better).     If you don t have symptoms, but a test showed that you have COVID-19 (you tested positive):    Stay home and away from others (self-isolate) until at least 10 days have passed since the date of your first positive COVID-19 test.    During this time:    Stay in your own room, even for meals. Use your own bathroom if you can.     Stay away from others in your home. No hugging, kissing  or shaking hands. No visitors.    Don t go to work, school or anywhere else.     Clean  high touch  surfaces often (doorknobs, counters, handles, etc.). Use a household cleaning spray or wipes. You ll find a full list on the EPA website:  www.epa.gov/pesticide-registration/list-n-disinfectants-use-against-sars-cov-2.    Cover your mouth and nose with a mask, tissue or washcloth to avoid spreading germs.    Wash your hands and face often. Use soap and water.    Caregivers in these groups are at risk for severe illness due to COVID-19:  o People 65 years and older  o People who live in a nursing home or long-term care facility  o People with chronic disease (lung, heart, cancer, diabetes, kidney, liver, immunologic)  o People who have a weakened immune system, including those who:  - Are in cancer treatment  - Take medicine that weakens the immune system, such as corticosteroids  - Had a bone marrow or organ transplant  - Have an immune deficiency  - Have poorly controlled HIV or AIDS  - Are obese (body mass index of 40 or higher)  - Smoke regularly    Caregivers should wear gloves while washing dishes, handling laundry and cleaning bedrooms and bathrooms.    Use caution when washing and drying laundry: Don t shake dirty laundry, and use the warmest water setting that you can.    For more tips, go to www.cdc.gov/coronavirus/2019-ncov/downloads/10Things.pdf.    How can I take care of myself?  1. Get lots of rest. Drink extra fluids (unless a doctor has told you not to).     2. Take Tylenol (acetaminophen) for fever or pain. If you have liver or kidney problems, ask your family doctor if it s okay to take Tylenol.     Adults can take either:     650 mg (two 325 mg pills) every 4 to 6 hours, or     1,000 mg (two 500 mg pills) every 8 hours as needed.     Note: Don t take more than 3,000 mg in one day.   Acetaminophen is found in many medicines (both prescribed and over-the-counter medicines). Read all labels to be sure  you don t take too much.     For children, check the Tylenol bottle for the right dose. The dose is based on the child s age or weight.    3. If you have other health problems (like cancer, heart failure, an organ transplant or severe kidney disease): Call your specialty clinic if you don t feel better in the next 2 days.    4. Know when to call 911: Emergency warning signs include:    Trouble breathing or shortness of breath    Pain or pressure in the chest that doesn t go away    Feeling confused like you haven t felt before, or not being able to wake up    Bluish-colored lips or face    What are the symptoms of COVID-19?     The most common symptoms are cough, fever and trouble breathing.     Less common symptoms include body aches, chills, diarrhea (loose, watery poops), fatigue (feeling very tired), headache, runny nose, sore throat and loss of smell.    COVID-19 can cause severe coughing (bronchitis) and lung infection (pneumonia).    How does it spread?     The virus may spread when a person coughs or sneezes into the air. The virus can travel about 6 feet this way, and it can live on surfaces.      Common  (household disinfectants) will kill the virus.    Who is at risk?  Anyone can catch COVID-19 if they re around someone who has the virus.    How can others protect themselves?     Stay away from people who have COVID-19 (or symptoms of COVID-19).    Wash hands often with soap and water. Or, use hand  with at least 60% alcohol.    Avoid touching the eyes, nose or mouth.     Wear a face mask when you go out in public, when sick or when caring for a sick person.    Where can I get more information?     [employer] [facility]: About COVID-19: [email or web address]    CDC: What to Do If You re Sick: www.cdc.gov/coronavirus/2019-ncov/about/steps-when-sick.html    CDC: Ending Home Isolation: www.cdc.gov/coronavirus/2019-ncov/hcp/disposition-in-home-patients.html     CDC: Caring for Someone:  www.cdc.gov/coronavirus/2019-ncov/if-you-are-sick/care-for-someone.html     [facility]: Interim Guidance for Hospital Discharge to Home: www.[facility]/diseases/coronavirus/hcp/hospdischarge.pdf    [facility] clinical trials (COVID-19 research studies): clinicalaffairs.[facility]-clinical-trials     Below are the COVID-19 hotlines at the [place] Department of Health ([facility]). Interpreters are available.   o For health questions: Call 678-575-4132 or 1-365.524.5337 (7 a.m. to 7 p.m.)  o For questions about schools and childcare: Call 939-773-5204 or 1-299.999.8558 (7 a.m. to 7 p.m.)          Thank you for taking steps to prevent the spread of this virus.  o Limit your contact with others.  o Wear a simple mask to cover your cough.  o Wash your hands well and often.    Resources    [facility]: About COVID-19: [email or web address]    CDC: What to Do If You're Sick: www.cdc.gov/coronavirus/2019-ncov/about/steps-when-sick.html    CDC: Ending Home Isolation: www.cdc.gov/coronavirus/2019-ncov/hcp/disposition-in-home-patients.html     CDC: Caring for Someone: www.cdc.gov/coronavirus/2019-ncov/if-you-are-sick/care-for-someone.html     [facility]: Interim Guidance for Hospital Discharge to Home: www.[facility]./diseases/coronavirus/hcp/hospdischarge.pdf    [facility] clinical trials (COVID-19 research studies): [email or web address].[email or web address]-clinical-trials     Below are the COVID-19 hotlines at the [place] Department of [facility] ([facility]). Interpreters are available.   o For health questions: Call 909-392-0690 or 1-156.810.4001 (7 a.m. to 7 p.m.)  o For questions about schools and childcare: Call 283-158-6463 or 1-342.647.9770 (7 a.m. to 7 p.m.)                         Additional Information    [1] Discomfort (pain, burning or stinging) when passing urine AND [2] female    Negative: Shock suspected (e.g., cold/pale/clammy skin, too weak to stand, low BP, rapid pulse)    Negative: Sounds  like a life-threatening emergency to the triager    Negative: [1] Unable to urinate (or only a few drops) > 4 hours AND [2] bladder feels very full (e.g., palpable bladder or strong urge to urinate)    Negative: Followed a genital area injury    Negative: Taking antibiotic for urinary tract infection (UTI)    Negative: Pregnant    Negative: Postpartum (from 0 to 6 weeks after delivery)    Negative: Vomiting    Negative: Patient sounds very sick or weak to the triager    Negative: [1] SEVERE pain with urination  (e.g., excruciating) AND [2] not improved after 2 hours of pain medicine and Sitz bath    Negative: Fever > 100.5 F (38.1 C)    Negative: Side (flank) or lower back pain present    Negative: Diabetes mellitus or weak immune system (e.g., HIV positive, cancer chemo, splenectomy, organ transplant, chronic steroids)    Negative: Bedridden (e.g., nursing home patient, CVA, chronic illness, recovering from surgery)    Negative: Artificial heart valve or artificial joint    Negative: Unusual vaginal discharge (e.g., bad smelling, yellow, green, or foamy-white)    Negative: Patient is worried about sexually transmitted disease (STD)    Negative: Possibility of pregnancy    Blood in urine (red, pink, or tea-colored)    Age > 50 years    Protocols used: URINARY SYMPTOMS-A-AH, URINATION PAIN - FEMALE-A-AH

## 2020-06-16 LAB
BACTERIA SPEC CULT: NORMAL
Lab: NORMAL
SPECIMEN SOURCE: NORMAL

## 2020-06-16 NOTE — RESULT ENCOUNTER NOTE
Dear Nichole,   The Urine culture is negative.   As we discussed on the phone, if the culture is negative and you have these symptoms, seeing Urology is the next step in the evaluation.  You could go to [facility] associates in [place] if they are in your insurance.    Osvaldo Bryant MD

## 2020-07-03 NOTE — PROGRESS NOTES
Nichole is a 54 year old  female who presents for annual exam.     Besides routine health maintenance,  she would like to discuss about the appointment with the Urologist.    HPI:  The patient's PCP is EDVIN DOHERTY.    Saw Urology this week for eval related to urinary complaints despite negative UC.  No findings on Cysto. Had CT and cytology.  Still non compliant with vaginal estrogen.      GYNECOLOGIC HISTORY:    No LMP recorded. Patient has had a hysterectomy.    Regular menses? Hysterectomy  Menses every  NA days.  Length of menses: NA days    Her current contraception method is: hysterectomy.  She  reports that she has quit smoking. She has never used smokeless tobacco.    Patient is sexually active.  STD testing offered?  Declined  Last PHQ-9 score on record =   PHQ-9 SCORE 2020   PHQ-9 Total Score 0     Last GAD7 score on record =   DAMION-7 SCORE 2020   Total Score 6     Alcohol Score = 3    HEALTH MAINTENANCE:  Cholesterol: 10/2/2019   Total= 274, Triglycerides=103, HDL=75, NAL=770, NVX=380   Last Mammo: Not applicable; Bilateral Mastectomy  Pap: LT - Pap no longer needed  Colonoscopy:  2015 @ [facility], Result: Normal, Next Colonoscopy: .  Dexa:  NA    Health maintenance updated:  yes    HISTORY:  OB History    Para Term  AB Living   4 3 3 0 1 3   SAB TAB Ectopic Multiple Live Births   1 0 0 0 3      # Outcome Date GA Lbr Jose/2nd Weight Sex Delivery Anes PTL Lv   4 SAB            3 Term         JENA   2 Term         JENA   1 Term         JENA       Patient Active Problem List   Diagnosis     Allergic rhinitis due to pollen     Atrophic vaginitis     BRCA gene positive     Lichen sclerosus     Family history of ovarian cancer     Worried well     Advanced care planning/counseling discussion     Past Surgical History:   Procedure Laterality Date     GYN SURGERY      BSO     KNEE SURGERY       LAPAROSCOPIC HYSTERECTOMY TOTAL      LTH - No cervix     LAPAROSCOPY      [facility]  for pelvic mass. Bx benign     MASTECTOMY, BILATERAL      prophylactic with reconstruction with saline implants due to BR Ca +       Social History     Tobacco Use     Smoking status: Former Smoker     Smokeless tobacco: Never Used   Substance Use Topics     Alcohol use: Yes     Alcohol/week: 0.0 standard drinks     Comment: Rare       Problem (# of Occurrences) Relation (Name,Age of Onset)    Aneurysm (1) Sister: aortic aneurysm    Colon Cancer (1) Other: Uncle    Lung Cancer (1) Father (62)    No Known Problems (5) Mother, Brother, Maternal Grandmother, Maternal Grandfather, Paternal Grandmother    Ovarian Cancer (1) Sister (54): Another sister with tumor of low malignant potential    Uterine Cancer (1) Other (62): aunt            Current Outpatient Medications   Medication Sig     clobetasol (TEMOVATE) 0.05 % external ointment APPLY TO VULVA TWICE WEEKLY     EPINEPHrine (EPIPEN) 0.3 MG/0.3ML injection Inject 0.3 mLs (0.3 mg) into the muscle once as needed for anaphylaxis     ESTRACE VAGINAL 0.1 MG/GM vaginal cream PLACE 1 GRAM VAGINALLY 2 TIMES A WEEK     estradiol (VAGIFEM) 10 MCG TABS vaginal tablet INSERT 1 TABLET(10 MCG) VAGINALLY 2 TIMES A WEEK     multivitamin, therapeutic with minerals (MULTI-VITAMIN) TABS Take 1 tablet by mouth daily     NONFORMULARY APPLY TO THE PERIANAL AREA  2 TO 3 TIMES DAILY AS DIRECTED     Polyethylene Glycol 3350 (MIRALAX PO)      sertraline (ZOLOFT) 25 MG tablet      No current facility-administered medications for this visit.      Allergies   Allergen Reactions     Diagnostic X-Ray Materials Hives     Hives to CT contrast. Ok with premedication.     Contrast Dye      CT     Penicillins      Sulfa Drugs        Past medical, surgical, social and family histories were reviewed and updated in EPIC.    ROS:   12 point review of systems negative other than symptoms noted below or in the HPI.      EXAM:  /62 (BP Location: Right arm, Patient Position: Sitting, Cuff Size: Adult  "Regular)   Pulse 76   Ht 1.626 m (5' 4\")   Wt 62.4 kg (137 lb 9.6 oz)   Breastfeeding No   BMI 23.62 kg/m     BMI: Body mass index is 23.62 kg/m .    PHYSICAL EXAM:  Constitutional:   Appearance: Well nourished, well developed, alert, in no acute distress  Neck:  Lymph Nodes:  No lymphadenopathy present    Thyroid:  Gland size normal, nontender, no nodules or masses present  on palpation  Chest:  Respiratory Effort:  Breathing unlabored  Cardiovascular:    Heart: Auscultation:  Regular rate, normal rhythm, no murmurs present  Breasts: S/P bilateral mastectomy with implants.   Gastrointestinal:   Abdominal Examination:  Abdomen nontender to palpation, tone normal without rigidity or guarding, no masses present, umbilicus without lesions   Liver and Spleen:  No hepatomegaly present, liver nontender to palpation    Hernias:  No hernias present  Lymphatic: Lymph Nodes:  No other lymphadenopathy present  Skin:  General Inspection:  No rashes present, no lesions present, no areas of  discoloration  Neurologic:    Mental Status:  Oriented X3.  Normal strength and tone, sensory exam                grossly normal, mentation intact and speech normal.    Psychiatric:   Mentation appears normal and affect normal/bright.         Pelvic Exam:  External Genitalia:     Normal appearance for age, no discharge present, no tenderness present, no inflammatory lesions present, color normal  Vagina:     Normal vaginal vault without central or paravaginal defects, no discharge present, no inflammatory lesions present, no masses present  Bladder:     Nontender to palpation  Urethra:   Urethral Body:  Urethra palpation normal, urethra structural support normal   Urethral Meatus:  No erythema or lesions present  Cervix:     Surgically absent  Uterus:     Surgically absent  Adnexa:     Surgically absent  Perineum:     Perineum within normal limits, no evidence of trauma, no rashes or skin lesions present  Anus:     Anus within normal " limits, no hemorrhoids present  Inguinal Lymph Nodes:     No lymphadenopathy present    COUNSELING:   Reviewed preventive health counseling, as reflected in patient instructions       Regular exercise    BMI: Body mass index is 23.62 kg/m .      ASSESSMENT:  54 year old female with satisfactory annual exam.    ICD-10-CM    1. Encounter for gynecological examination without abnormal finding  Z01.419    2. Atrophic vaginitis  N95.2 estradiol (ESTRACE VAGINAL) 0.1 MG/GM vaginal cream     estradiol (VAGIFEM) 10 MCG TABS vaginal tablet   3. Family history of ovarian cancer  Z80.41    4. BRCA gene positive  Z15.01     Z15.09        PLAN:  Will try to use vaginal estrogen twice a week. Has both pills and cream. Should help with urinary complaints and dyspareunia.  Discussed pelvic floor exercises with PT.      Osvaldo Bryant MD

## 2020-07-07 ENCOUNTER — OFFICE VISIT (OUTPATIENT)
Dept: OBGYN | Facility: CLINIC | Age: 55
End: 2020-07-07
Payer: COMMERCIAL

## 2020-07-07 VITALS
DIASTOLIC BLOOD PRESSURE: 62 MMHG | BODY MASS INDEX: 23.49 KG/M2 | HEART RATE: 76 BPM | HEIGHT: 64 IN | WEIGHT: 137.6 LBS | SYSTOLIC BLOOD PRESSURE: 108 MMHG

## 2020-07-07 DIAGNOSIS — Z01.419 ENCOUNTER FOR GYNECOLOGICAL EXAMINATION WITHOUT ABNORMAL FINDING: Primary | ICD-10-CM

## 2020-07-07 DIAGNOSIS — Z15.09 BRCA GENE POSITIVE: ICD-10-CM

## 2020-07-07 DIAGNOSIS — Z80.41 FAMILY HISTORY OF OVARIAN CANCER: ICD-10-CM

## 2020-07-07 DIAGNOSIS — N95.2 ATROPHIC VAGINITIS: ICD-10-CM

## 2020-07-07 DIAGNOSIS — Z15.01 BRCA GENE POSITIVE: ICD-10-CM

## 2020-07-07 PROCEDURE — 99396 PREV VISIT EST AGE 40-64: CPT | Performed by: OBSTETRICS & GYNECOLOGY

## 2020-07-07 RX ORDER — ESTRADIOL 10 UG/1
INSERT VAGINAL
Qty: 24 TABLET | Refills: 4 | Status: SHIPPED | OUTPATIENT
Start: 2020-07-07 | End: 2021-07-05

## 2020-07-07 RX ORDER — ESTRADIOL 0.1 MG/G
CREAM VAGINAL
Qty: 42.5 G | Refills: 4 | Status: SHIPPED | OUTPATIENT
Start: 2020-07-07 | End: 2021-07-05

## 2020-07-07 ASSESSMENT — PATIENT HEALTH QUESTIONNAIRE - PHQ9
5. POOR APPETITE OR OVEREATING: SEVERAL DAYS
SUM OF ALL RESPONSES TO PHQ QUESTIONS 1-9: 0

## 2020-07-07 ASSESSMENT — ANXIETY QUESTIONNAIRES
IF YOU CHECKED OFF ANY PROBLEMS ON THIS QUESTIONNAIRE, HOW DIFFICULT HAVE THESE PROBLEMS MADE IT FOR YOU TO DO YOUR WORK, TAKE CARE OF THINGS AT HOME, OR GET ALONG WITH OTHER PEOPLE: SOMEWHAT DIFFICULT
6. BECOMING EASILY ANNOYED OR IRRITABLE: SEVERAL DAYS
7. FEELING AFRAID AS IF SOMETHING AWFUL MIGHT HAPPEN: SEVERAL DAYS
2. NOT BEING ABLE TO STOP OR CONTROL WORRYING: SEVERAL DAYS
1. FEELING NERVOUS, ANXIOUS, OR ON EDGE: SEVERAL DAYS
GAD7 TOTAL SCORE: 6
3. WORRYING TOO MUCH ABOUT DIFFERENT THINGS: SEVERAL DAYS
5. BEING SO RESTLESS THAT IT IS HARD TO SIT STILL: NOT AT ALL

## 2020-07-07 ASSESSMENT — MIFFLIN-ST. JEOR: SCORE: 1204.15

## 2020-07-08 ASSESSMENT — ANXIETY QUESTIONNAIRES: GAD7 TOTAL SCORE: 6

## 2020-08-10 ENCOUNTER — TELEPHONE (OUTPATIENT)
Dept: OBGYN | Facility: CLINIC | Age: 55
End: 2020-08-10

## 2020-08-10 NOTE — TELEPHONE ENCOUNTER
The bleeding is from crack or trauma with IC. Give the estrogen more time. Exam in the office showed no lesions. Not sure 8/11 appt will be productive.

## 2020-08-10 NOTE — TELEPHONE ENCOUNTER
Called and informed pt of response below. Encouraged pt to give it more time and if still concerning, make OV to discuss with Dr Bryant any concerns.  Pt verbalized understanding, in agreement with plan, and voiced no further questions.  Seema Terrell RN on 8/10/2020 at 12:35 PM

## 2020-10-19 NOTE — PROGRESS NOTES
SUBJECTIVE:                                                   Nichole Butler is a 54 year old female who presents to clinic today for the following health issue(s):  Patient presents with:  Vaginal Bleeding: Patient noticed slits at the entrance of the vagina. Patient states she would have light pink blood when she wipes 3/4 of the times. Patient does use the Estrace tablets. Patient does have hx of Lichen Schlorsis. Patient does have burning sensation when urinating.       HPI:  Nichole presents with introital pain and bleeding with intercourse. When she looked at her vulva she saw cracks of the skin. She has a presumed diagnosis of lichen sclerosus of the vulva and uses clobetasol once a week. She had a vulvar biopsy that did not actually show lichen sclerosus with the Dermatologist. She uses vaginal estrogen cream and also applies it to the vulva. The introitus is also the area with initial discomfort with sexual intercourse. She has no dysuria but feels burning when the urine contacts the skin with the slits.      No LMP recorded. Patient has had a hysterectomy..     Patient is sexually active, .  Using hysterectomy for contraception.    reports that she has quit smoking. She has never used smokeless tobacco.    STD testing offered?  Declined    Health maintenance updated:  yes    Today's PHQ-2 Score:   PHQ-2 (  [employer]) 2020   Q1: Little interest or pleasure in doing things 0   Q2: Feeling down, depressed or hopeless 0   PHQ-2 Score 0     Today's PHQ-9 Score:   PHQ-9 SCORE 2020   PHQ-9 Total Score 0     Today's DAMION-7 Score:   DAMION-7 SCORE 2020   Total Score 6       Problem list and histories reviewed & adjusted, as indicated.  Additional history: as documented.    Patient Active Problem List   Diagnosis     Allergic rhinitis due to pollen     Atrophic vaginitis     BRCA gene positive     Lichen sclerosus     Family history of ovarian cancer     Worried well     Advanced care  planning/counseling discussion     Past Surgical History:   Procedure Laterality Date     GYN SURGERY      BSO     KNEE SURGERY       LAPAROSCOPIC HYSTERECTOMY TOTAL      LTH - No cervix     L[facility]OSCOPY  [facility] pelvic [facility]. Bx benign     MASTECTOMY, BILATERAL      prophylactic with reconstruction with saline implants due to BR Ca +       Social History     Tobacco Use     Smoking status: Former Smoker     Smokeless tobacco: Never Used   Substance Use Topics     Alcohol use: Yes     Alcohol/week: 0.0 standard drinks     Comment: Rare       Problem (# of Occurrences) Relation (Name,Age of Onset)    Aneurysm (1) Sister: aortic aneurysm    Colon Cancer (1) Other: Uncle    Lung Cancer (1) Father (62)    No Known Problems (5) Mother, Brother, Maternal Grandmother, Maternal Grandfather, Paternal Grandmother    Ovarian Cancer (1) Sister (54): Another sister with tumor of low malignant potential    Uterine Cancer (1) Other (62): aunt            Current Outpatient Medications   Medication Sig     ALPRAZolam (XANAX) 0.25 MG tablet Take 0.25-0.5 mg by mouth     clobetasol (TEMOVATE) 0.05 % external ointment APPLY TO VULVA TWICE WEEKLY     clotrimazole-betamethasone (LOTRISONE) 1-0.05 % external cream Apply topically 2 times daily For the next 7 days then use twice a week     EPINEPHrine (EPIPEN) 0.3 MG/0.3ML injection Inject 0.3 mLs (0.3 mg) into the muscle once as needed for anaphylaxis     estradiol (ESTRACE VAGINAL) 0.1 MG/GM vaginal cream PLACE 1 GRAM VAGINALLY 2 TIMES A WEEK     estradiol (VAGIFEM) 10 MCG TABS vaginal tablet INSERT 1 TABLET(10 MCG) VAGINALLY 2 TIMES A WEEK     fluconazole (DIFLUCAN) 150 MG tablet Take 1 tablet (150 mg) by mouth once for 1 dose     multivitamin, therapeutic with minerals (MULTI-VITAMIN) TABS Take 1 tablet by mouth daily     NONFORMULARY APPLY TO THE PERIANAL AREA  2 TO 3 TIMES DAILY AS DIRECTED     Polyethylene Glycol 3350 (MIRALAX PO)      sertraline (ZOLOFT) 25 MG tablet       "UNABLE TO FIND MEDICATION NAME: Jeannine     No current facility-administered medications for this visit.      Allergies   Allergen Reactions     Diagnostic X-Ray Materials Hives     Hives to CT contrast. Ok with premedication.     Contrast Dye      CT     Penicillins      Sulfa Drugs        ROS:  12 point review of systems negative other than symptoms noted below or in the HPI.  Genitourinary: Painful Urination and Spotting  No urinary frequency or dysuria, bladder or kidney problems      OBJECTIVE:     BP 90/72 (BP Location: Right arm, Patient Position: Sitting, Cuff Size: Adult Regular)   Pulse 72   Ht 1.626 m (5' 4\")   Wt 63 kg (139 lb)   Breastfeeding No   BMI 23.86 kg/m    Body mass index is 23.86 kg/m .    Exam:  Constitutional:  Appearance: Well nourished, well developed alert, in no acute distress  Neurologic:  Mental Status:  Oriented X3.  Normal strength and tone, sensory exam grossly normal, mentation intact and speech normal.    Psychiatric:  Mentation appears normal and affect normal/bright.  External Genitalia: normal labia majora, attenuated labia minora. The urethra is normal but the vaginal opening is significantly foreshortened. There are some linear excoriations at the introitus and hypopigmented skin on the perineum down to the perianal skin  Vagina: no discharge  Cervix: absent    In-Clinic Test Results:  No results found for this or any previous visit (from the past 24 hour(s)).    ASSESSMENT/PLAN:                                                        ICD-10-CM    1. Vulvar itching  L29.2 Yeast culture     clotrimazole-betamethasone (LOTRISONE) 1-0.05 % external cream     fluconazole (DIFLUCAN) 150 MG tablet     Given her estrogen use I recommend checking for a yeast infection. I counseled Nichole that the culture might be low yield due to the lack of discharge. I also recommend using a combination steroid-yeast ointment in addition to one dose of oral diflucan. It is reasonable to repeat " her vulvar biopsy with the dermatologist due to Nichole's anxiety. I did reassure her that as long as she uses the topical steroid twice weekly she is reducing her risk of vulvar cancer if she has lichen sclerosus. Other interventions to prevent vulvitis such as not wearing undergarments overnight was discussed to allow more aeration.     Cindy Ramirez MD  [facility] FOR [facility] [place]

## 2020-10-20 ENCOUNTER — OFFICE VISIT (OUTPATIENT)
Dept: OBGYN | Facility: CLINIC | Age: 55
End: 2020-10-20
Payer: COMMERCIAL

## 2020-10-20 VITALS
SYSTOLIC BLOOD PRESSURE: 90 MMHG | BODY MASS INDEX: 23.73 KG/M2 | WEIGHT: 139 LBS | HEART RATE: 72 BPM | DIASTOLIC BLOOD PRESSURE: 72 MMHG | HEIGHT: 64 IN

## 2020-10-20 DIAGNOSIS — L29.2 VULVAR ITCHING: Primary | ICD-10-CM

## 2020-10-20 PROCEDURE — 99213 OFFICE O/P EST LOW 20 MIN: CPT | Performed by: OBSTETRICS & GYNECOLOGY

## 2020-10-20 PROCEDURE — 87102 FUNGUS ISOLATION CULTURE: CPT | Performed by: OBSTETRICS & GYNECOLOGY

## 2020-10-20 RX ORDER — CLOTRIMAZOLE AND BETAMETHASONE DIPROPIONATE 10; .64 MG/G; MG/G
CREAM TOPICAL 2 TIMES DAILY
Qty: 45 G | Refills: 3 | Status: SHIPPED | OUTPATIENT
Start: 2020-10-20 | End: 2021-07-05

## 2020-10-20 RX ORDER — ALPRAZOLAM 0.25 MG
.25-.5 TABLET ORAL
COMMUNITY
Start: 2019-10-02

## 2020-10-20 RX ORDER — FLUCONAZOLE 150 MG/1
150 TABLET ORAL ONCE
Qty: 1 TABLET | Refills: 0 | Status: SHIPPED | OUTPATIENT
Start: 2020-10-20 | End: 2020-10-20

## 2020-10-20 ASSESSMENT — MIFFLIN-ST. JEOR: SCORE: 1210.5

## 2020-10-26 LAB
Lab: NORMAL
SPECIMEN SOURCE: NORMAL
YEAST SPEC QL CULT: NORMAL

## 2020-11-23 ENCOUNTER — NURSE TRIAGE (OUTPATIENT)
Dept: NURSING | Facility: CLINIC | Age: 55
End: 2020-11-23

## 2020-11-23 NOTE — TELEPHONE ENCOUNTER
Nichole reports increased urgency and burning when peeing. No fever. No side/back pain. Symptoms started this morning.    She asks for an antibiotic prescription. FNA explained she would need to be seen first and she verbalized agreement.    Per protocol, advised virtual visit within the day. Care advice reviewed. Patient verbalizes understanding. Advised to call back with further questions/concerns.     Shayna Castro RN/[place] Nurse Advisor        Additional Information    Negative: Shock suspected (e.g., cold/pale/clammy skin, too weak to stand, low BP, rapid pulse)    Negative: Sounds like a life-threatening emergency to the triager    Negative: [1] Unable to urinate (or only a few drops) > 4 hours AND     [2] bladder feels very full (e.g., palpable bladder or strong urge to urinate)    Negative: [1] Decreased urination and [2] drinking very little AND [2] dehydration suspected (e.g., dark urine, no urine > 12 hours, very dry mouth, very lightheaded)    Negative: Patient sounds very sick or weak to the triager    Negative: Fever > 100.5 F (38.1 C)    Negative: Side (flank) or lower back pain present    Negative: [1] Can't control passage of urine (i.e., urinary incontinence) AND [2] new onset (< 2 weeks) or worsening    Urinating more frequently than usual (i.e., frequency)    Protocols used: URINARY SYMPTOMS-A-AH

## 2020-12-09 NOTE — PROGRESS NOTES
SUBJECTIVE:                                                   Nichole Butler is a 55 year old female who presents to clinic today for the following health issue(s):  Patient presents with:  Breast Problem: Bi-annual breast check up. No concerns.   Vaginal Problem: Patient states she has pain during intercourse.         HPI:  No chest issues. Did have implant replaced due to leakage.  Having insertional dyspareunia. Feels there is a crack. Has seen Derm in past and given Dx of LSA. Has used steroid cream in past for that.    No LMP recorded. Patient has had a hysterectomy..     Patient is sexually active, .  Using hysterectomy for contraception.    reports that she has quit smoking. She has never used smokeless tobacco.    STD testing offered?  Declined    Health maintenance updated:  yes    Today's PHQ-2 Score:   PHQ-2 (  [employer]) 2020   Q1: Little interest or pleasure in doing things 0   Q2: Feeling down, depressed or hopeless 0   PHQ-2 Score 0     Today's PHQ-9 Score:   PHQ-9 SCORE 12/10/2020   PHQ-9 Total Score 1     Today's DAMION-7 Score:   DAMION-7 SCORE 12/10/2020   Total Score 1       Problem list and histories reviewed & adjusted, as indicated.  Additional history: as documented.    Patient Active Problem List   Diagnosis     Allergic rhinitis due to pollen     Atrophic vaginitis     BRCA gene positive     Lichen sclerosus     Family history of ovarian cancer     Worried well     Advanced care planning/counseling discussion     Past Surgical History:   Procedure Laterality Date     GYN SURGERY      BSO     KNEE SURGERY       LAPAROSCOPIC HYSTERECTOMY TOTAL      LTH - No cervix     LAPAROSCOPY      [facility] for pelvic mass. Bx benign     MASTECTOMY, BILATERAL      prophylactic with reconstruction with saline implants due to BR Ca +       Social History     Tobacco Use     Smoking status: Former Smoker     Smokeless tobacco: Never Used   Substance Use Topics     Alcohol use: Yes      "Alcohol/week: 0.0 standard drinks     Comment: Rare       Problem (# of Occurrences) Relation (Name,Age of Onset)    Aneurysm (1) Sister: aortic aneurysm    Colon Cancer (1) Other: Uncle    Lung Cancer (1) Father (62)    No Known Problems (5) Mother, Brother, Maternal Grandmother, Maternal Grandfather, Paternal Grandmother    Ovarian Cancer (1) Sister (54): Another sister with tumor of low malignant potential    Uterine Cancer (1) Other (62): aunt            Current Outpatient Medications   Medication Sig     ALPRAZolam (XANAX) 0.25 MG tablet Take 0.25-0.5 mg by mouth     clotrimazole-betamethasone (LOTRISONE) 1-0.05 % external cream Apply topically 2 times daily For the next 7 days then use twice a week     EPINEPHrine (EPIPEN) 0.3 MG/0.3ML injection Inject 0.3 mLs (0.3 mg) into the muscle once as needed for anaphylaxis     estradiol (ESTRACE VAGINAL) 0.1 MG/GM vaginal cream PLACE 1 GRAM VAGINALLY 2 TIMES A WEEK     estradiol (VAGIFEM) 10 MCG TABS vaginal tablet INSERT 1 TABLET(10 MCG) VAGINALLY 2 TIMES A WEEK     multivitamin, therapeutic with minerals (MULTI-VITAMIN) TABS Take 1 tablet by mouth daily     Polyethylene Glycol 3350 (MIRALAX PO)      sertraline (ZOLOFT) 25 MG tablet      No current facility-administered medications for this visit.      Allergies   Allergen Reactions     Diagnostic X-Ray Materials Hives     Hives to CT contrast. Ok with premedication.     Contrast Dye      CT     Penicillins      Sulfa Drugs        ROS:  12 point review of systems negative other than symptoms noted below or in the HPI.  Genitourinary: Painful [place]        OBJECTIVE:     /70 (BP Location: Right arm, Patient Position: Sitting, Cuff Size: Adult Regular)   Pulse 64   Ht 1.626 m (5' 4\")   Wt 62.9 kg (138 lb 9.6 oz)   Breastfeeding No   BMI 23.79 kg/m    Body mass index is 23.79 kg/m .    Exam:  Constitutional:  Appearance: Well nourished, well developed alert, in no acute distress  Neck:  Lymph Nodes:  No " lymphadenopathy present; Thyroid:  Gland size normal, nontender, no nodules or masses present on palpation  Breasts:  S/P BILATERAL MASTECTOMY AND IMPLANTS  Gastrointestinal:  Abdominal Examination:  Abdomen nontender to palpation, tone normal without rigidity or guarding, no masses present, umbilicus without lesions; Liver/Spleen:  No hepatomegaly present, liver nontender to palpation; Hernias:  No hernias present  Neurologic:  Mental Status:  Oriented X3.  Normal strength and tone, sensory exam grossly normal, mentation intact and speech normal.    Psychiatric:  Mentation appears normal and affect normal/bright.  Pelvic Exam:  External Genitalia:     Normal appearance for age, no discharge present, no tenderness present, no inflammatory lesions present, color normal, WHITE THICKENED TISSUE AT POSTERIOR [place]. AGGLUTINATION OF CLITORAL [place]A. NO LSA CHANGES NOTED ON VULVA  Vagina:     Normal vaginal vault without central or paravaginal defects, no discharge present, no inflammatory lesions present, no masses present. ATROPHIC  Bladder:     Nontender to palpation  Urethra:   Urethral Body:  Urethra palpation normal, urethra structural support normal   Urethral Meatus:  No erythema or lesions present  Cervix:     Surgically absent  Uterus:     Surgically absent  Adnexa:     Surgically absent  Perineum:     Perineum within normal limits, no evidence of trauma, no rashes or skin lesions present  Anus:     Anus within normal limits, no hemorrhoids present  Inguinal Lymph Nodes:     No lymphadenopathy present     In-Clinic Test Results:  No results found for this or any previous visit (from the past 24 hour(s)).    ASSESSMENT/PLAN:                                                        ICD-10-CM    1. Encounter for gynecological examination without abnormal finding  Z01.419    2. BRCA gene positive  Z15.01     Z15.09    3. Dyspareunia, female  N94.10    4. Lichen sclerosus et atrophicus of the vulva  N90.4         Discussed LSA changes noted. Instructed to use clobetasol to this area bid for 12 weeks. Then prn.   Discussed LSA and squamous cell ca. No lesions noted. Nothing suspicious.    Osvaldo Bryant MD  [facility]

## 2020-12-10 ENCOUNTER — OFFICE VISIT (OUTPATIENT)
Dept: OBGYN | Facility: CLINIC | Age: 55
End: 2020-12-10
Payer: COMMERCIAL

## 2020-12-10 VITALS
HEIGHT: 64 IN | DIASTOLIC BLOOD PRESSURE: 70 MMHG | WEIGHT: 138.6 LBS | HEART RATE: 64 BPM | SYSTOLIC BLOOD PRESSURE: 104 MMHG | BODY MASS INDEX: 23.66 KG/M2

## 2020-12-10 DIAGNOSIS — N94.10 DYSPAREUNIA, FEMALE: ICD-10-CM

## 2020-12-10 DIAGNOSIS — Z01.419 ENCOUNTER FOR GYNECOLOGICAL EXAMINATION WITHOUT ABNORMAL FINDING: Primary | ICD-10-CM

## 2020-12-10 DIAGNOSIS — Z15.09 BRCA GENE POSITIVE: ICD-10-CM

## 2020-12-10 DIAGNOSIS — N90.4 LICHEN SCLEROSUS ET ATROPHICUS OF THE VULVA: ICD-10-CM

## 2020-12-10 DIAGNOSIS — Z15.01 BRCA GENE POSITIVE: ICD-10-CM

## 2020-12-10 PROCEDURE — 99213 OFFICE O/P EST LOW 20 MIN: CPT | Performed by: OBSTETRICS & GYNECOLOGY

## 2020-12-10 ASSESSMENT — PATIENT HEALTH QUESTIONNAIRE - PHQ9
SUM OF ALL RESPONSES TO PHQ QUESTIONS 1-9: 1
5. POOR APPETITE OR OVEREATING: NOT AT ALL

## 2020-12-10 ASSESSMENT — ANXIETY QUESTIONNAIRES
3. WORRYING TOO MUCH ABOUT DIFFERENT THINGS: NOT AT ALL
IF YOU CHECKED OFF ANY PROBLEMS ON THIS QUESTIONNAIRE, HOW DIFFICULT HAVE THESE PROBLEMS MADE IT FOR YOU TO DO YOUR WORK, TAKE CARE OF THINGS AT HOME, OR GET ALONG WITH OTHER PEOPLE: NOT DIFFICULT AT ALL
1. FEELING NERVOUS, ANXIOUS, OR ON EDGE: SEVERAL DAYS
GAD7 TOTAL SCORE: 1
6. BECOMING EASILY ANNOYED OR IRRITABLE: NOT AT ALL
7. FEELING AFRAID AS IF SOMETHING AWFUL MIGHT HAPPEN: NOT AT ALL
2. NOT BEING ABLE TO STOP OR CONTROL WORRYING: NOT AT ALL
5. BEING SO RESTLESS THAT IT IS HARD TO SIT STILL: NOT AT ALL

## 2020-12-10 ASSESSMENT — MIFFLIN-ST. JEOR: SCORE: 1203.69

## 2020-12-11 ASSESSMENT — ANXIETY QUESTIONNAIRES: GAD7 TOTAL SCORE: 1

## 2021-07-01 NOTE — PROGRESS NOTES
Nichole is a 55 year old  female who presents for annual exam.     Besides routine health maintenance, has a few things to discuss today.    HPI:    Nichole presents for her annual GYN visit. She is a previous patient of Dr. Bryant who delivered all of her children. She is s/p hysterectomy and bilateral mastectomy for BRCA 2 mutation of undetermined significance/ polymorphism. She regularly follows with her PCP and a Dermatologist. She is moving to [place] in August of this year! Two of her sons schooled at [facility] [employer] and she and her  really like the area of [place].  She has known lichen sclerosus and the topical betamethasone/clotrimazole works well. She however notes intermittent vaginal odor without discharge. She has also had recurrent UTIs. She had a full work up with Urology for microscopic hematuria and her cystoscopy and CT were negative. She has no UTI symptoms today. She also has been having anal fissures as well.   She has used Uqora to help with the recurrent UTIs and it caused GI upset.    The patient's PCP is Dr EDVIN DOHERTY.        GYNECOLOGIC HISTORY:    No LMP recorded. Patient has had a hysterectomy.  She  reports that she has quit smoking. She has never used smokeless tobacco.  Patient is sexually active.  STD testing offered?  Declined     Last PHQ-9 score on record =   PHQ-9 SCORE 2021   PHQ-9 Total Score 1     Last GAD7 score on record =   DAMION-7 SCORE 2021   Total Score 3     Alcohol Score = 3    HEALTH MAINTENANCE:  Cholesterol: followed by primary care provider, found in Care Everywhere, 10/23/20   Total= 239, Triglycerides=92, HDL=70, EQQ=377, FBS=86  Last Mammo: N/A, patient had prophylactic bilateral mastectomy  Pap: N/A total hysterectomy  Colonoscopy:  2015, Result: Normal, Next Colonoscopy: 10 years.  Dexa:  Per patient years ago  Health maintenance updated:  yes    HISTORY:  OB History    Para Term  AB Living   4 3 3 0 1 3   SAB TAB Ectopic  Multiple Live Births   1 0 0 0 3      # Outcome Date GA Lbr Jose/2nd Weight Sex Delivery Anes PTL Lv   4 SAB            3 Term         JENA   2 Term         JENA   1 Term         JENA       Patient Active Problem List   Diagnosis     Allergic rhinitis due to pollen     Atrophic vaginitis     BRCA gene positive     Lichen sclerosus     Family history of ovarian cancer     Worried well     Advanced care planning/counseling discussion     Past Surgical History:   Procedure Laterality Date     GYN SURGERY      BSO     KNEE SURGERY       LAPAROSCOPIC HYSTERECTOMY TOTAL      LTH - No cervix     [facility]. [facility]x [facility]     MASTECTOMY, BILATERAL      prophylactic with reconstruction with saline implants due to BR Ca +       Social History     Tobacco Use     Smoking status: Former Smoker     Smokeless tobacco: Never Used   Substance Use Topics     Alcohol use: Yes     Alcohol/week: 0.0 standard drinks     Comment: Rare       Problem (# of Occurrences) Relation (Name,Age of Onset)    Aneurysm (1) Sister: aortic aneurysm    Colon Cancer (1) Other: Uncle    Lung Cancer (1) Father (62)    No Known Problems (5) Mother, Brother, Maternal Grandmother, Maternal Grandfather, Paternal Grandmother    Ovarian Cancer (1) Sister (54): Another sister with tumor of low malignant potential    Uterine Cancer (1) Other (62): aunt            Current Outpatient Medications   Medication Sig     ALPRAZolam (XANAX) 0.25 MG tablet Take 0.25-0.5 mg by mouth     clotrimazole-betamethasone (LOTRISONE) 1-0.05 % external cream Apply topically 2 times daily For the next 7 days then use twice a week     EPINEPHrine (EPIPEN) 0.3 MG/0.3ML injection Inject 0.3 mLs (0.3 mg) into the muscle once as needed for anaphylaxis     estradiol (ESTRACE VAGINAL) 0.1 MG/GM vaginal cream PLACE 1 GRAM VAGINALLY 2 TIMES A WEEK     estradiol (VAGIFEM) 10 MCG TABS vaginal tablet INSERT 1 TABLET(10 MCG) VAGINALLY 2 TIMES A WEEK     metroNIDAZOLE  "(METROGEL) 0.75 % vaginal gel Place 1 applicator (5 g) vaginally daily as needed For vaginal odor     multivitamin, therapeutic with minerals (MULTI-VITAMIN) TABS Take 1 tablet by mouth daily     Polyethylene Glycol 3350 (MIRALAX PO)      sertraline (ZOLOFT) 25 MG tablet      No current facility-administered medications for this visit.      Allergies   Allergen Reactions     Diagnostic X-Ray Materials Hives     Hives to CT contrast. Ok with premedication.     Contrast Dye      CT     Penicillins      Sulfa Drugs        Past medical, surgical, social and family histories were reviewed and updated in EPIC.    ROS:   12 point review of systems negative other than symptoms noted below or in the HPI.  POSITIVE for:, hx of urinary tract infections    EXAM:  BP 98/62   Ht 1.626 m (5' 4\")   Wt 62.1 kg (137 lb)   BMI 23.52 kg/m     BMI: Body mass index is 23.52 kg/m .    PHYSICAL EXAM:  Constitutional:   Appearance: Well nourished, well developed, alert, in no acute distress  Neck:  Lymph Nodes:  No lymphadenopathy present today    Thyroid:  Gland size normal, nontender, no nodules or masses present on palpation  Chest:  Respiratory Effort:  Breathing unlabored, CTAB  Cardiovascular:    Heart: Auscultation:  Regular rate, normal rhythm, no murmurs present  Breasts: implants present. No palpable masses bilaterally. No axillary lymphadenopathy.  Gastrointestinal:   Abdominal Examination:  Abdomen nontender to palpation, tone normal without rigidity or guarding, no masses present, umbilicus without lesions   Liver and Spleen:  No hepatomegaly present, liver nontender to palpation    Hernias:  No hernias present  Lymphatic: Lymph Nodes:  No other lymphadenopathy present  Skin:  General Inspection:  No rashes present, no lesions present, no areas of  discoloration  Neurologic:    Mental Status:  Oriented X3.  Normal strength and tone, sensory exam                grossly normal, mentation intact and speech normal.  "   Psychiatric:   Mentation appears normal and affect normal/bright.         Pelvic Exam:  External Genitalia:     Normal appearance for age, no discharge present, no tenderness present, no inflammatory lesions present, color normal  Vagina:     Normal vaginal vault without central or paravaginal defects, no discharge present, no inflammatory lesions present, no masses present, atrophic  Bladder:     Nontender to palpation  Urethra:   Urethral Body:  Urethra palpation normal, urethra structural support normal   Urethral Meatus:  No erythema or lesions present  Cervix:     Surgically absent  Uterus:     Surgically absent  Adnexa:     Surgically absent  Perineum:     Perineum within normal limits, no evidence of trauma, no rashes or skin lesions present  Anus:     Anus within normal limits, no hemorrhoids present, no fissures seen on external inspection      COUNSELING:   Reviewed preventive health counseling, as reflected in patient instructions    BMI: Body mass index is 23.52 kg/m .      ASSESSMENT:  55 year old female with satisfactory annual exam.    ICD-10-CM    1. Encounter for gynecological examination without abnormal finding  Z01.419    2. Vulvar itching  L29.2 clotrimazole-betamethasone (LOTRISONE) 1-0.05 % external cream   3. Atrophic vaginitis  N95.2 estradiol (ESTRACE VAGINAL) 0.1 MG/GM vaginal cream     estradiol (VAGIFEM) 10 MCG TABS vaginal tablet   4. Vaginal odor  N89.8 metroNIDAZOLE (METROGEL) 0.75 % vaginal gel   5. Benign essential microscopic hematuria  R31.1 UA with Microscopic       PLAN:  No evidence of bacterial vaginosis on exam today. Revisited strategies to prevent irritation. Will prescribe vaginal metrogel to use PRN. Discussed vaginal boric acid as well.   Uqora was looked up and the D-Mannose likely caused GI intolerance. I recommend instead 1000mg of vitamin C daily to acidify the urine. Consideration of Hiprex was also discussed. I recommend discussing it further with her PCP if  vitamin C alone does not help. Continue vaginal estrogen.   Perineum rinses after bowel movements were advised.  A screening UA was also sent today due to her history of hematuria. If trace hematuria is seen, I recommend expectant management due to her previous work up.     Cindy Ramirez MD

## 2021-07-05 ENCOUNTER — OFFICE VISIT (OUTPATIENT)
Dept: OBGYN | Facility: CLINIC | Age: 56
End: 2021-07-05
Payer: COMMERCIAL

## 2021-07-05 VITALS
HEIGHT: 64 IN | DIASTOLIC BLOOD PRESSURE: 62 MMHG | BODY MASS INDEX: 23.39 KG/M2 | SYSTOLIC BLOOD PRESSURE: 98 MMHG | WEIGHT: 137 LBS

## 2021-07-05 DIAGNOSIS — R31.1 BENIGN ESSENTIAL MICROSCOPIC HEMATURIA: ICD-10-CM

## 2021-07-05 DIAGNOSIS — N95.2 ATROPHIC VAGINITIS: ICD-10-CM

## 2021-07-05 DIAGNOSIS — L29.2 VULVAR ITCHING: ICD-10-CM

## 2021-07-05 DIAGNOSIS — N89.8 VAGINAL ODOR: ICD-10-CM

## 2021-07-05 DIAGNOSIS — Z01.419 ENCOUNTER FOR GYNECOLOGICAL EXAMINATION WITHOUT ABNORMAL FINDING: Primary | ICD-10-CM

## 2021-07-05 LAB
ALBUMIN UR-MCNC: NEGATIVE MG/DL
APPEARANCE UR: CLEAR
BILIRUB UR QL STRIP: NEGATIVE
COLOR UR AUTO: YELLOW
GLUCOSE UR STRIP-MCNC: NEGATIVE MG/DL
HGB UR QL STRIP: ABNORMAL
KETONES UR STRIP-MCNC: NEGATIVE MG/DL
LEUKOCYTE ESTERASE UR QL STRIP: NEGATIVE
NITRATE UR QL: NEGATIVE
NON-SQ EPI CELLS #/AREA URNS LPF: ABNORMAL /LPF
PH UR STRIP: 6 PH (ref 5–7)
RBC #/AREA URNS AUTO: ABNORMAL /HPF
SOURCE: ABNORMAL
SP GR UR STRIP: 1.01 (ref 1–1.03)
UROBILINOGEN UR STRIP-ACNC: 0.2 EU/DL (ref 0.2–1)
WBC #/AREA URNS AUTO: ABNORMAL /HPF

## 2021-07-05 PROCEDURE — 81001 URINALYSIS AUTO W/SCOPE: CPT | Performed by: OBSTETRICS & GYNECOLOGY

## 2021-07-05 PROCEDURE — 99396 PREV VISIT EST AGE 40-64: CPT | Performed by: OBSTETRICS & GYNECOLOGY

## 2021-07-05 RX ORDER — CLOTRIMAZOLE AND BETAMETHASONE DIPROPIONATE 10; .64 MG/G; MG/G
CREAM TOPICAL 2 TIMES DAILY
Qty: 45 G | Refills: 3 | Status: SHIPPED | OUTPATIENT
Start: 2021-07-05 | End: 2024-08-08

## 2021-07-05 RX ORDER — ESTRADIOL 10 UG/1
INSERT VAGINAL
Qty: 24 TABLET | Refills: 4 | Status: SHIPPED | OUTPATIENT
Start: 2021-07-05

## 2021-07-05 RX ORDER — METRONIDAZOLE 7.5 MG/G
1 GEL VAGINAL DAILY PRN
Qty: 70 G | Refills: 3 | Status: SHIPPED | OUTPATIENT
Start: 2021-07-05

## 2021-07-05 RX ORDER — ESTRADIOL 0.1 MG/G
CREAM VAGINAL
Qty: 42.5 G | Refills: 4 | Status: SHIPPED | OUTPATIENT
Start: 2021-07-05

## 2021-07-05 ASSESSMENT — MIFFLIN-ST. JEOR: SCORE: 1196.43

## 2021-07-05 ASSESSMENT — ANXIETY QUESTIONNAIRES
3. WORRYING TOO MUCH ABOUT DIFFERENT THINGS: SEVERAL DAYS
GAD7 TOTAL SCORE: 3
2. NOT BEING ABLE TO STOP OR CONTROL WORRYING: SEVERAL DAYS
IF YOU CHECKED OFF ANY PROBLEMS ON THIS QUESTIONNAIRE, HOW DIFFICULT HAVE THESE PROBLEMS MADE IT FOR YOU TO DO YOUR WORK, TAKE CARE OF THINGS AT HOME, OR GET ALONG WITH OTHER PEOPLE: SOMEWHAT DIFFICULT
5. BEING SO RESTLESS THAT IT IS HARD TO SIT STILL: NOT AT ALL
6. BECOMING EASILY ANNOYED OR IRRITABLE: NOT AT ALL
7. FEELING AFRAID AS IF SOMETHING AWFUL MIGHT HAPPEN: NOT AT ALL
1. FEELING NERVOUS, ANXIOUS, OR ON EDGE: SEVERAL DAYS

## 2021-07-05 ASSESSMENT — PATIENT HEALTH QUESTIONNAIRE - PHQ9
SUM OF ALL RESPONSES TO PHQ QUESTIONS 1-9: 1
5. POOR APPETITE OR OVEREATING: NOT AT ALL

## 2021-07-06 ASSESSMENT — ANXIETY QUESTIONNAIRES: GAD7 TOTAL SCORE: 3

## 2021-09-18 ENCOUNTER — HEALTH MAINTENANCE LETTER (OUTPATIENT)
Age: 56
End: 2021-09-18

## 2021-11-13 ENCOUNTER — HEALTH MAINTENANCE LETTER (OUTPATIENT)
Age: 56
End: 2021-11-13

## 2021-11-16 ENCOUNTER — E-VISIT (OUTPATIENT)
Dept: OBGYN | Facility: CLINIC | Age: 56
End: 2021-11-16
Payer: COMMERCIAL

## 2021-11-16 ENCOUNTER — TELEPHONE (OUTPATIENT)
Dept: OBGYN | Facility: CLINIC | Age: 56
End: 2021-11-16

## 2021-11-16 DIAGNOSIS — B37.31 CANDIDAL VULVOVAGINITIS: Primary | ICD-10-CM

## 2021-11-16 PROCEDURE — 99421 OL DIG E/M SVC 5-10 MIN: CPT | Performed by: OBSTETRICS & GYNECOLOGY

## 2021-11-16 RX ORDER — FLUCONAZOLE 150 MG/1
150 TABLET ORAL ONCE
Qty: 1 TABLET | Refills: 0 | Status: SHIPPED | OUTPATIENT
Start: 2021-11-16 | End: 2021-11-16

## 2021-11-16 NOTE — PATIENT INSTRUCTIONS
Thank you for choosing us for your care. I have placed an order for a prescription so that you can start treatment. View your full visit summary for details by clicking on the link below. Your pharmacist will able to address any questions you may have about the medication.     If you re not feeling better within 2-3 days, please schedule an appointment.  You can schedule an appointment right here in [employer], or call [phone]  If the visit is for the same symptoms as your eVisit, we ll refund the cost of your eVisit if seen within seven days.      Yeast Infection (Candida Vaginal Infection)    You have a Candida vaginal infection. This is also known as a yeast infection. It's most often caused by a type of yeast (fungus) called Candida. Candida are normally found in the vagina. But if they increase in number, this can lead to infection and cause symptoms.   Symptoms of a yeast infection can include:     Clumpy or thin, white discharge, which may look like cottage cheese    Itching or burning    Burning with urination  Certain factors can make a yeast infection more likely. These can include:     Taking certain medicines, such as antibiotics or birth control pills    Pregnancy    Diabetes    Weak immune system  A yeast infection is most often treated with antifungal medicine. This may be given as a vaginal cream or pills you take by mouth. Treatment may last for about 1 to 7 days. Women with severe or recurrent infections may need longer courses of treatment.   Home care    If you re prescribed medicine, be sure to use it as directed. Finish all of the medicine, even if your symptoms go away. Don t try to treat yourself using over-the-counter products without talking with your provider first. They will let you know if this is a good option for you.    Ask your provider what steps you can take to help reduce your risk of having a yeast infection in the future.    Follow-up care  Follow up with your healthcare  provider, or as directed.   When to seek medical advice  Call your healthcare provider right away if:     You have a fever of 100.4 F (38 C) or higher, or as directed by your provider.    Your symptoms worsen, or they don t go away within a few days of starting treatment.    You have new pain in the lower belly or pelvic region.    You have side effects that bother you or a reaction to the cream or pills you re prescribed.    You or any partners you have sex with have new symptoms, such as a rash, joint pain, or sores.  [employer] last reviewed this educational content on 7/1/2020 2000-2021 [employer]. All rights reserved. This information is not intended as a substitute for professional medical care. Always follow your healthcare professional's instructions.

## 2021-11-16 NOTE — TELEPHONE ENCOUNTER
Pt is being treated for a UTI currently - doctor in [place]  Placed on a longer term abx-macrobid  Feels she has sx's of yeast infection - itching, no discharge. Asking for difulcan tablet if possible. She is in town visiting and would need rx sent to [facility].    Recommended E-visit - she still has [employer] account and aware how to do that. She will start and e-visit for this and I will send this as FYI to Dr Ramirez to look out for it.    Pt verbalized understanding, in agreement with plan, and voiced no further questions.  Seema Terrell RN on 11/16/2021 at 9:51 AM

## 2022-08-20 ENCOUNTER — HEALTH MAINTENANCE LETTER (OUTPATIENT)
Age: 57
End: 2022-08-20

## 2022-11-19 ENCOUNTER — HEALTH MAINTENANCE LETTER (OUTPATIENT)
Age: 57
End: 2022-11-19

## 2023-09-10 ENCOUNTER — HEALTH MAINTENANCE LETTER (OUTPATIENT)
Age: 58
End: 2023-09-10

## 2024-07-18 ENCOUNTER — PATIENT OUTREACH (OUTPATIENT)
Dept: ONCOLOGY | Facility: CLINIC | Age: 59
End: 2024-07-18
Payer: COMMERCIAL

## 2024-07-18 ENCOUNTER — TRANSCRIBE ORDERS (OUTPATIENT)
Dept: OTHER | Age: 59
End: 2024-07-18

## 2024-07-18 ENCOUNTER — PRE VISIT (OUTPATIENT)
Dept: ONCOLOGY | Facility: CLINIC | Age: 59
End: 2024-07-18
Payer: COMMERCIAL

## 2024-07-18 DIAGNOSIS — Z80.41 FAMILY HISTORY OF MALIGNANT NEOPLASM OF OVARY: ICD-10-CM

## 2024-07-18 DIAGNOSIS — N90.89 VULVAR MASS: Primary | ICD-10-CM

## 2024-07-18 NOTE — TELEPHONE ENCOUNTER
Action July 18, 2024 9:20 AM ABT   Action Taken Warm Transfer from [facility] - NPS    Spoke to patient, states that she was seen by Dr. Hatfield many years ago and had surgery with him also states that Dr. Hatfield was going back and forth between [facility] & [facility]. Currently seeing OBGYN Dr. Rasheed - did not have PAP and/or HPV    Recent imaging done Dec 2023     RECORDS STATUS - ALL OTHER DIAGNOSIS      RECORDS RECEIVED FROM: [facility],    NOTES STATUS DETAILS   OFFICE NOTE from referring provider SELF    OFFICE NOTE from other specialist [facility] 04/05/24: Dr. Waleska Rasheed   OPERATIVE REPORT [facility] 07/18/11: Diagnostic laparoscopy   MEDICATION LIST [facility]    LABS     PATHOLOGY REPORTS Report in [facility] & [facility] Epic:  07/18/11: [id number]  07/18/11: [id number]    HP:  10/21/03: [id number]   ANYTHING RELATED TO DIAGNOSIS [facility] Most recent 07/09/24   GENONOMIC TESTING     TYPE: External: [facility] 07/24/12 & 09/24/04   IMAGING (NEED IMAGES & REPORT)     CT SCANS PACS PN:  12/08/23: CT AP   MRI PACS PN:  12/21/23: MR Abd  08/26/20-09/08/16: MR Brain   ULTRASOUND PACS PN:  12/08/23: US Abd

## 2024-07-18 NOTE — PROGRESS NOTES
"New Patient Hematology / Oncology Nurse Navigator Note     Referral Date: 7/18/24    Referring provider: Self-referred to re-establish care with Dr. Hatfield. Previously saw him at     Referring Clinic/Organization: [facility]   Self Referred     Referred to: GynOnc    Requested provider (if applicable): Dr. Hatfield    Evaluation for : Vulvar lesion. She has a known BRCA2 mutation of undetermined significance/polymorphism and family history of ovarian cancer     Previously established with Dr. Hatfield at  (Hysterectomy in 2003, last visit 2020)    She would like to re-establish with Dr. Hatfield due to \"Bulge\" in vulvar area. Had checked last fall and this spring by OBGYN at  but wants to cover all bases and also re-establish with Dr. Hatfield given her complex family history of cancer.      Clinical History (per Nurse review of records provided):    4/5/24 Office Visit with OBGYN at --BOOKMARKED  1/23/20 Office Visit with Dr. Hatfield at -- BOOKMARKED  10/21/2003 Admission Note: Hysterecomy -- BOOKMARKED  9/24/2004 Genetic Testing results   -- BOOKMARKED    Clinical Assessment / Barriers to Care (Per Nurse):  Pt lives in [place]     Records Location: Care Everywhere     Records Needed:   Please obtain path report from hysterectomy at  10/21/2003 with Dr Hatfield    Additional testing needed prior to consult:   N/A    Referral updates and Plan:   OUTGOING CALL to pt:  Introduced my role as nurse navigator with [facility] Hematology/Oncology dept and that we have recd the self-referral to re-establish care with Dr. Hatfield  Pt confirms they are aware of the referral and ready to schedule  Provided contact information if future questions arise.     -Transferred pt to NPS line [phone] to schedule appt per scheduling instructions.    Mary Henriquez, BSN, RN, PHN, OCN  Hematology/Oncology Nurse Navigator  [facility]  [phone]    "

## 2024-08-06 NOTE — PROGRESS NOTES
RE: Nichole Butler  : 1965  SILVIA: 2024     iNchole Butler is a 58 y.o. old woman with a diagnosis of remote hysterectomy for strong family history of ovarian cancer () and a vulvar lesion.  She was last seen by me in        She is here today, after being evaluated by Dr. Rasheed for a small vulvar mass.  She has been feeling otherwise well, but has long-standing LS for which she uses clobetasol and topical estrogen.  She get breast examinations q6 months with Dr. Rasheed but it has been about 4 months and she is requesting that this be done today as well.    Review of Systems:  Systemic   no weight changes; no fever; no chills; no night sweats; no appetite changes  Skin   no rashes, or lesions  Eye   no irritation; no changes in vision  Gianluca-Laryngeal   no dysphagia; no hoarseness   Pulmonary   no cough; no shortness of breath  Cardiovascular   no chest pain; no palpitations  Gastrointestinal   no diarrhea; no constipation; no abdominal pain; no changes in bowel habits; no blood in stool  Genitourinary  no urinary frequency; no urinary urgency; no dysuria; no pain; no abnormal vaginal discharge; no abnormal vaginal bleeding  Breast   no breast discharge; no breast changes; no breast pain  Musculoskeletal   no myalgias; no arthralgias; no back pain  Psychiatric   no depressed mood; no anxiety   Hematologic   no tender lymph nodes; no noticeable swellings or lumps   Endocrine   no hot flashes; no heat/cold intolerance   Neurological  no tremor; no numbness and tingling; no headaches; no difficulty sleeping    Past Medical History:  Past Medical History:   Diagnosis Date    Anal fissure    Anxiety (HRC)       Past Surgical History:  Past Surgical History:   Procedure Laterality Date    EYE SURGERY    HYSTERECTOMY   LW Problem: Hysterectomy S/p LW Modifier: neha Mack LW Onset:     IRIDOTOMY/IRIDECTOMY Right 3-7-16   [facility]    IRIDOTOMY/IRIDECTOMY Left 3-14-16   [facility]    KNEE ARTHROSCOPY   Left  "   LAPAROSCOPY 2011   Negative exploratory surgery    MASTECTOMY   LW Problem: Mastectomy Bilateral Total S/p LW Modifier: prophylactic. has gene for breast ca LW Onset: 06May05    POSTERIOR CRUCIATE LIGAMENT REPAIR 1983   Left       Current Medications:   Nichole has a current medication list which includes the following prescription(s): alprazolam, clobetasol, epinephrine, estradiol, estradiol, multiple vitamins-minerals, polyethylene glycol 3350, and sertraline.     Allergies:   Allergies   Allergen Reactions    Iodinated Diagnostic Agents Hives   Hives to CT contrast. Ok with premedication.    Penicillins Hives    Sulfa Antibiotics Hives       Social History:  Social History     Tobacco Use    Smoking status: Never Smoker    Smokeless tobacco: Never Used   Substance Use Topics    Alcohol use: Yes   Alcohol/week: 2.0 standard drinks   Types: 2 Glasses of wine per week    Drug use: No     Social History     Substance and Sexual Activity   Drug Use No     Family History:   The patient's family history is notable for .    Family History   Problem Relation Age of Onset    Arthritis Birth Mother   Rheumatoid    Diabetes Birth Mother    Cancer Birth Father   Lung    Cancer Sister   ovarian    Diabetes Sister    Cancer Sister   Ovarian    Diabetes Maternal Aunt   2 maternal aunts    Blindness Maternal Aunt   legally blind-diabetic    Cataract Maternal Grandmother    Diabetes Cousin   2 maternal cousins    Glaucoma Negative Family History    Macular Degeneration Negative Family History    Retinal Detachment Negative Family History    Amblyopia/Strabismus Negative Family History    Stroke Negative Family History    Thyroid Disorder Negative Family History    Hypertension Negative Family History     Physical Exam:   /69 (BP Location: Right arm)   Pulse 69   Resp 16   Ht 1.626 m (5' 4\")   Wt 62 kg (136 lb 11.2 oz)   SpO2 97%   BMI 23.46 kg/m       General Appearance: healthy and alert, no distress    HEENT: no " thyromegaly, no palpable nodules or masses     Breast exam: no dominant masses, no axillary masses, healthy appearing skin over implants.    Cardiovascular: regular rate and rhythm, no gallops, rubs or murmurs     Respiratory: lungs clear, no rales, rhonchi or wheezes, normal diaphragmatic excursion    Musculoskeletal: extremities non tender and without edema    Skin: no lesions or rashes     Neurological: normal gait, no gross defects    Psychiatric: appropriate mood and affect     Hematological: normal cervical, supraclavicular and inguinal lymph nodes    Gastrointestinal: abdomen soft, non-tender, non-distended, no organomegaly or masses    Genitourinary: External genitalia and urethral meatus appears normal. There is no clear areas of dysplastic or LS appearing skin (good local control).  I identified no vulvar masses - but the patient was able to identify the area (posterior, right introitus), which I suspect is the residua of an old obstetric scar.    Assessment:  Nichole is a 54 y.o. old woman with a diagnosis of BRCA, s/p vuvlar biospy    A total of 40 minutes was spent with the patient, 30 minutes of which were spent in counseling the patient and/or treatment planning.    Plan:   1.) BRCA - she has had breast and ovarian surgery already and is doing well from those perspectives.     2.) Genetic risk factors were assessed and the patient has a known BRCA mutation    3.) Labs and/or tests ordered include: none.    4.) Vulvar lesion. After discussing the clinical findings I have recommended she continue to use the clobetasol she has been using, followed by a re-examination which can be done here or with the dermatology team.    Sincerely,    Betito Hatfield MD

## 2024-08-08 ENCOUNTER — ONCOLOGY VISIT (OUTPATIENT)
Dept: ONCOLOGY | Facility: CLINIC | Age: 59
End: 2024-08-08
Attending: OBSTETRICS & GYNECOLOGY
Payer: COMMERCIAL

## 2024-08-08 VITALS
DIASTOLIC BLOOD PRESSURE: 69 MMHG | HEART RATE: 69 BPM | RESPIRATION RATE: 16 BRPM | SYSTOLIC BLOOD PRESSURE: 103 MMHG | WEIGHT: 136.7 LBS | OXYGEN SATURATION: 97 % | HEIGHT: 64 IN | BODY MASS INDEX: 23.34 KG/M2

## 2024-08-08 DIAGNOSIS — Z15.01 BRCA GENE POSITIVE: Primary | ICD-10-CM

## 2024-08-08 DIAGNOSIS — L90.0 LICHEN SCLEROSUS: ICD-10-CM

## 2024-08-08 DIAGNOSIS — Z15.09 BRCA GENE POSITIVE: Primary | ICD-10-CM

## 2024-08-08 PROCEDURE — 99204 OFFICE O/P NEW MOD 45 MIN: CPT | Performed by: OBSTETRICS & GYNECOLOGY

## 2024-08-08 PROCEDURE — 99213 OFFICE O/P EST LOW 20 MIN: CPT | Performed by: OBSTETRICS & GYNECOLOGY

## 2024-08-08 RX ORDER — FAMOTIDINE 10 MG
10 TABLET ORAL 2 TIMES DAILY
COMMUNITY

## 2024-08-08 RX ORDER — FLUTICASONE PROPIONATE 50 MCG
1 SPRAY, SUSPENSION (ML) NASAL DAILY
COMMUNITY

## 2024-08-08 ASSESSMENT — PAIN SCALES - GENERAL: PAINLEVEL: NO PAIN (0)

## 2024-08-08 NOTE — LETTER
2024      Nichole Butler  [address] [place] [address]      Dear Colleague,    Thank you for referring your patient, Nichole Butler, to the [facility]. Please see a copy of my visit note below.    RE: Nichole Butler  : 1965  SILVIA: 2024     Nichole Butler is a 58 y.o. old woman with a diagnosis of remote hysterectomy for strong family history of ovarian cancer () and a vulvar lesion.  She was last seen by me in        She is here today, after being evaluated by Dr. Rasheed for a small vulvar mass.  She has been feeling otherwise well, but has long-standing LS for which she uses clobetasol and topical estrogen.  She get breast examinations q6 months with Dr. Rasheed but it has been about 4 months and she is requesting that this be done today as well.    Review of Systems:  Systemic   no weight changes; no fever; no chills; no night sweats; no appetite changes  Skin   no rashes, or lesions  Eye   no irritation; no changes in vision  Gianluca-Laryngeal   no dysphagia; no hoarseness   Pulmonary   no cough; no shortness of breath  Cardiovascular   no chest pain; no palpitations  Gastrointestinal   no diarrhea; no constipation; no abdominal pain; no changes in bowel habits; no blood in stool  Genitourinary  no urinary frequency; no urinary urgency; no dysuria; no pain; no abnormal vaginal discharge; no abnormal vaginal bleeding  Breast   no breast discharge; no breast changes; no breast pain  Musculoskeletal   no myalgias; no arthralgias; no back pain  Psychiatric   no depressed mood; no anxiety   Hematologic   no tender lymph nodes; no noticeable swellings or lumps   Endocrine   no hot flashes; no heat/cold intolerance   Neurological  no tremor; no numbness and tingling; no headaches; no difficulty sleeping    Past Medical History:  Past Medical History:   Diagnosis Date     Anal fissure     Anxiety ([facility]C)       Past Surgical History:  Past Surgical  History:   Procedure Laterality Date     EYE SURGERY     HYSTERECTOMY   LW Problem: Hysterectomy S/p LW Modifier: bilat [place] LW Onset: 10Nov04     IRIDOTOMY/IRIDECTOMY Right 3-7-16   [facility]     IRIDOTOMY/IRIDECTOMY Left 3-14-16   [facility]     KNEE ARTHROSCOPY   Left     LAPAROSCOPY 2011   Negative exploratory surgery     MASTECTOMY   LW Problem: Mastectomy Bilateral Total S/p LW Modifier: prophylactic. has gene for breast ca LW Onset: 06May05     POSTERIOR CRUCIATE LIGAMENT REPAIR 1983   Left       Current Medications:   Nichole has a current medication list which includes the following prescription(s): alprazolam, clobetasol, epinephrine, estradiol, estradiol, multiple vitamins-minerals, polyethylene glycol 3350, and sertraline.     Allergies:   Allergies   Allergen Reactions     Iodinated Diagnostic Agents Hives   Hives to CT contrast. Ok with premedication.     Penicillins Hives     Sulfa Antibiotics Hives       Social History:  Social History     Tobacco Use     Smoking status: Never Smoker     Smokeless tobacco: Never Used   Substance Use Topics     Alcohol use: Yes   Alcohol/week: 2.0 standard drinks   Types: 2 Glasses of wine per week     Drug use: No     Social History     Substance and Sexual Activity   Drug Use No     Family History:   The patient's family history is notable for .    Family History   Problem Relation Age of Onset     Arthritis Birth Mother   Rheumatoid     Diabetes Birth Mother     Cancer Birth Father   Lung     Cancer Sister   ovarian     Diabetes Sister     Cancer Sister   Ovarian     Diabetes Maternal Aunt   2 maternal aunts     Blindness Maternal Aunt   legally blind-diabetic     Cataract Maternal Grandmother     Diabetes Cousin   2 maternal cousins     Glaucoma Negative Family History     Macular Degeneration Negative Family History     Retinal Detachment Negative Family History     Amblyopia/Strabismus Negative Family History     Stroke Negative Family History     Thyroid Disorder Negative  "Family History     Hypertension Negative Family History     Physical Exam:   /69 (BP Location: Right arm)   Pulse 69   Resp 16   Ht 1.626 m (5' 4\")   Wt 62 kg (136 lb 11.2 oz)   SpO2 97%   BMI 23.46 kg/m       General Appearance: healthy and alert, no distress    HEENT: no thyromegaly, no palpable nodules or masses     Breast exam: no dominant masses, no axillary masses, healthy appearing skin over implants.    Cardiovascular: regular rate and rhythm, no gallops, rubs or murmurs     Respiratory: lungs clear, no rales, rhonchi or wheezes, normal diaphragmatic excursion    Musculoskeletal: extremities non tender and without edema    Skin: no lesions or rashes     Neurological: normal gait, no gross defects    Psychiatric: appropriate mood and affect     Hematological: normal cervical, supraclavicular and inguinal lymph nodes    Gastrointestinal: abdomen soft, non-tender, non-distended, no organomegaly or masses    Genitourinary: External genitalia and urethral meatus appears normal. There is no clear areas of dysplastic or LS appearing skin (good local control).  I identified no vulvar masses - but the patient was able to identify the area (posterior, right introitus), which I suspect is the residua of an old obstetric scar.    Assessment:  Nichole is a 54 y.o. old woman with a diagnosis of BRCA, s/p vuvlar biospy    A total of 40 minutes was spent with the patient, 30 minutes of which were spent in counseling the patient and/or treatment planning.    Plan:   1.) BRCA - she has had breast and ovarian surgery already and is doing well from those perspectives.     2.) Genetic risk factors were assessed and the patient has a known BRCA mutation    3.) Labs and/or tests ordered include: none.    4.) Vulvar lesion. After discussing the clinical findings I have recommended she continue to use the clobetasol she has been using, followed by a re-examination which can be done here or with the dermatology " team.    Sincerely,    Betito Hatfield MD       Again, thank you for allowing me to participate in the care of your patient.        Sincerely,        Betito Hatfield MD

## 2024-08-08 NOTE — NURSING NOTE
"Oncology Rooming Note    August 8, 2024 10:01 AM   Nichole Butler is a 58 year old female who presents for:    Chief Complaint   Patient presents with    Oncology Clinic Visit     New Patient Vulvar Mass     Initial Vitals: /69 (BP Location: Right arm)   Pulse 69   Resp 16   Ht 1.626 m (5' 4\")   Wt 62 kg (136 lb 11.2 oz)   SpO2 97%   BMI 23.46 kg/m   Estimated body mass index is 23.46 kg/m  as calculated from the following:    Height as of this encounter: 1.626 m (5' 4\").    Weight as of this encounter: 62 kg (136 lb 11.2 oz). Body surface area is 1.67 meters squared.  No Pain (0) Comment: Data Unavailable   No LMP recorded. Patient has had a hysterectomy.  Allergies reviewed: Yes  Medications reviewed: Yes    Medications: Medication refills not needed today.  Pharmacy name entered into [facility]: [id number] [place] [id number] [facility]    Frailty Screening:   Is the patient here for a new oncology consult visit in cancer care? 2. No      Clinical concerns: not new to Dr. Hatfield- h/o hysterectomy with Dr. Hatfield years ago. Now has lesion on vulva that she is concerned about.     Patient declined female chaperone for pelvic exam today.    Stephanie Fay LPN              "

## 2024-11-03 ENCOUNTER — HEALTH MAINTENANCE LETTER (OUTPATIENT)
Age: 59
End: 2024-11-03